# Patient Record
Sex: MALE | Race: WHITE | URBAN - METROPOLITAN AREA
[De-identification: names, ages, dates, MRNs, and addresses within clinical notes are randomized per-mention and may not be internally consistent; named-entity substitution may affect disease eponyms.]

---

## 2017-08-23 ENCOUNTER — TELEPHONE (OUTPATIENT)
Dept: INTERNAL MEDICINE | Facility: CLINIC | Age: 62
End: 2017-08-23

## 2017-08-23 NOTE — TELEPHONE ENCOUNTER
Reason for Call:  Same Day Appointment, Requested Provider:  Anthony Limon MD    PCP: No primary care provider on file. (Soon to be Ashly)    Reason for visit: ER follow up and note for work    Duration of symptoms: since 8/2    Have you been treated for this in the past? No    Additional comments: Please work him in ASAP because he needs to get back into work     Can we leave a detailed message on this number? YES    Phone number patient can be reached at: Home number on file [phone] (home)    Best Time: ASAP    Call taken on 8/23/2017 at 12:44 PM by Ace Vogel

## 2017-08-24 NOTE — TELEPHONE ENCOUNTER
Please verify this is correct chart/patient.   He has not been seen at a [facility] facility.    As best I can tell, he should not have been scheduled with me, as I am not taking new patients.    Please redirect to a provider accepting new patients.

## 2017-09-08 ENCOUNTER — TRANSFERRED RECORDS (OUTPATIENT)
Dept: HEALTH INFORMATION MANAGEMENT | Facility: CLINIC | Age: 62
End: 2017-09-08

## 2017-09-13 ENCOUNTER — OFFICE VISIT (OUTPATIENT)
Dept: INTERNAL MEDICINE | Facility: CLINIC | Age: 62
End: 2017-09-13
Payer: COMMERCIAL

## 2017-09-13 VITALS
HEART RATE: 91 BPM | BODY MASS INDEX: 27.71 KG/M2 | SYSTOLIC BLOOD PRESSURE: 175 MMHG | DIASTOLIC BLOOD PRESSURE: 92 MMHG | WEIGHT: 182.8 LBS | HEIGHT: 68 IN | TEMPERATURE: 98.7 F | OXYGEN SATURATION: 98 %

## 2017-09-13 DIAGNOSIS — Z09 HOSPITAL DISCHARGE FOLLOW-UP: Primary | ICD-10-CM

## 2017-09-13 DIAGNOSIS — Z72.0 TOBACCO ABUSE: ICD-10-CM

## 2017-09-13 DIAGNOSIS — Z71.89 COUNSELING REGARDING ADVANCED DIRECTIVES: ICD-10-CM

## 2017-09-13 DIAGNOSIS — Z23 NEED FOR TDAP VACCINATION: ICD-10-CM

## 2017-09-13 DIAGNOSIS — I63.9 CEREBROVASCULAR ACCIDENT (CVA), UNSPECIFIED MECHANISM (H): ICD-10-CM

## 2017-09-13 DIAGNOSIS — I10 ESSENTIAL HYPERTENSION, BENIGN: ICD-10-CM

## 2017-09-13 DIAGNOSIS — E78.5 HYPERLIPIDEMIA LDL GOAL <100: ICD-10-CM

## 2017-09-13 LAB
ALBUMIN SERPL-MCNC: 3.5 G/DL (ref 3.4–5)
ALP SERPL-CCNC: 101 U/L (ref 40–150)
ALT SERPL W P-5'-P-CCNC: 23 U/L (ref 0–70)
ANION GAP SERPL CALCULATED.3IONS-SCNC: 7 MMOL/L (ref 3–14)
AST SERPL W P-5'-P-CCNC: 19 U/L (ref 0–45)
BILIRUB SERPL-MCNC: 0.6 MG/DL (ref 0.2–1.3)
BUN SERPL-MCNC: 12 MG/DL (ref 7–30)
CALCIUM SERPL-MCNC: 8.9 MG/DL (ref 8.5–10.1)
CHLORIDE SERPL-SCNC: 103 MMOL/L (ref 94–109)
CHOLEST SERPL-MCNC: 145 MG/DL
CO2 SERPL-SCNC: 25 MMOL/L (ref 20–32)
CREAT SERPL-MCNC: 0.95 MG/DL (ref 0.66–1.25)
GFR SERPL CREATININE-BSD FRML MDRD: 80 ML/MIN/1.7M2
GLUCOSE SERPL-MCNC: 97 MG/DL (ref 70–99)
HDLC SERPL-MCNC: 62 MG/DL
LDLC SERPL CALC-MCNC: 71 MG/DL
NONHDLC SERPL-MCNC: 83 MG/DL
POTASSIUM SERPL-SCNC: 3.7 MMOL/L (ref 3.4–5.3)
PROT SERPL-MCNC: 7.7 G/DL (ref 6.8–8.8)
SODIUM SERPL-SCNC: 135 MMOL/L (ref 133–144)
TRIGL SERPL-MCNC: 61 MG/DL

## 2017-09-13 PROCEDURE — 80061 LIPID PANEL: CPT | Performed by: INTERNAL MEDICINE

## 2017-09-13 PROCEDURE — 80053 COMPREHEN METABOLIC PANEL: CPT | Performed by: INTERNAL MEDICINE

## 2017-09-13 PROCEDURE — 99203 OFFICE O/P NEW LOW 30 MIN: CPT | Performed by: INTERNAL MEDICINE

## 2017-09-13 PROCEDURE — 36415 COLL VENOUS BLD VENIPUNCTURE: CPT | Performed by: INTERNAL MEDICINE

## 2017-09-13 RX ORDER — ATORVASTATIN CALCIUM 10 MG/1
10 TABLET, FILM COATED ORAL AT BEDTIME
COMMUNITY
Start: 2017-08-04 | End: 2017-11-01

## 2017-09-13 RX ORDER — LISINOPRIL 10 MG/1
10 TABLET ORAL DAILY
Qty: 90 TABLET | Refills: 3 | Status: SHIPPED | OUTPATIENT
Start: 2017-09-13 | End: 2017-09-15

## 2017-09-13 RX ORDER — ASPIRIN 81 MG/1
81 TABLET, CHEWABLE ORAL DAILY
COMMUNITY
Start: 2017-08-04

## 2017-09-13 RX ORDER — LISINOPRIL 5 MG/1
5 TABLET ORAL DAILY
COMMUNITY
Start: 2017-09-08 | End: 2017-09-13 | Stop reason: DRUGHIGH

## 2017-09-13 NOTE — MR AVS SNAPSHOT
After Visit Summary   9/13/2017    Adebayo Sierra    MRN: [id number]           Patient Information     Date Of Birth          1955        Visit Information        Provider Department      9/13/2017 2:20 PM Kobe Goodwin MD [facility]        Today's Diagnoses     Hospital discharge follow-up    -  1    Cerebrovascular accident (CVA), unspecified mechanism (H)        Essential hypertension, benign        Hyperlipidemia LDL goal <100        Need for Tdap vaccination        Counseling regarding advanced directives        Tobacco abuse           Follow-ups after your visit        Additional Services     [facility] PT, HAND, AND CHIROPRACTIC REFERRAL       **This order will print in the [facility] Scheduling Office**    Physical Therapy, Hand Therapy and Chiropractic Care are available through:    *[facility]  *[facility]  *[facility]    Call one number to schedule at any of the above locations: [phone].    Your provider has referred you to: Physical Therapy at [facility] or [facility]    Indication/Reason for Referral: post CVA  Onset of Illness: 8/2017  Therapy Orders: Evaluate and Treat  Special Programs: None  Special Request: None    Meli Garrison      Additional Comments for the Therapist or Chiropractor:     Please be aware that coverage of these services is subject to the terms and limitations of your health insurance plan.  Call member services at your health plan with any benefit or coverage questions.      Please bring the following to your appointment:    *Your personal calendar for scheduling future appointments  *Comfortable clothing                  Follow-up notes from your care team     Return if symptoms worsen or fail to improve.      Who to contact     If you have questions or need follow up information about today's clinic visit or your schedule please contact [facility] directly at  "[phone].  Normal or non-critical lab and imaging results will be communicated to you by MyChart, letter or phone within 4 business days after the clinic has received the results. If you do not hear from us within 7 days, please contact the clinic through [employer] or phone. If you have a critical or abnormal lab result, we will notify you by phone as soon as possible.  Submit refill requests through [employer] or call your pharmacy and they will forward the refill request to us. Please allow 3 business days for your refill to be completed.          Additional Information About Your Visit        [employer] Information     [employer] lets you send messages to your doctor, view your test results, renew your prescriptions, schedule appointments and more. To sign up, go to [email or web address] . Click on \"Log in\" on the left side of the screen, which will take you to the Welcome page. Then click on \"Sign up Now\" on the right side of the page.     You will be asked to enter the access code listed below, as well as some personal information. Please follow the directions to create your username and password.     Your access code is: [email or web address]  Expires: 2017  3:24 PM     Your access code will  in 90 days. If you need help or a new code, please call your [place] clinic or [phone].        Care EveryWhere ID     This is your Care EveryWhere ID. This could be used by other organizations to access your [place] medical records  [id number]        Your Vitals Were     Pulse Temperature Height Pulse Oximetry BMI (Body Mass Index)       91 98.7  F (37.1  C) (Oral) 5' 8\" (1.727 m) 98% 27.79 kg/m2        Blood Pressure from Last 3 Encounters:   17 (!) 175/92    Weight from Last 3 Encounters:   17 182 lb 12.8 oz (82.9 kg)              We Performed the Following     Comprehensive metabolic panel     [facility], HAND, AND CHIROPRACTIC REFERRAL     Lipid Profile          Today's Medication Changes          These " changes are accurate as of: 9/13/17  3:24 PM.  If you have any questions, ask your nurse or doctor.               These medicines have changed or have updated prescriptions.        Dose/Directions    lisinopril 10 MG tablet   Commonly known as:  PRINIVIL/ZESTRIL   This may have changed:    - medication strength  - how much to take   Used for:  Essential hypertension, benign, Cerebrovascular accident (CVA), unspecified mechanism (H)   Changed by:  Kobe Goodwin MD        Dose:  10 mg   Take 1 tablet (10 mg) by mouth daily   Quantity:  90 tablet   Refills:  3            Where to get your medicines      These medications were sent to [facility] [address] [facility], [place] - [address] AT [facility] [address] [facility], [facility] [address]     Phone:  [phone]     lisinopril 10 MG tablet                Primary Care Provider    None Specified       No primary provider on file.        Equal Access to Services     [facility] : Hadii orin ku hadasho Soomaali, waaxda luqadaha, qaybta kaalmada adeegyada, waxay dixiein hayvidyan ashlyn brown . So [facility] [phone].    ATENCIÓN: Si habla español, tiene a costa disposición servicios gratuitos de asistencia lingüística. Chad al [phone].    We comply with applicable federal civil rights laws and [place] laws. We do not discriminate on the basis of race, color, national origin, age, disability sex, sexual orientation or gender identity.            Thank you!     Thank you for choosing [facility]  for your care. Our goal is always to provide you with excellent care. Hearing back from our patients is one way we can continue to improve our services. Please take a few minutes to complete the written survey that you may receive in the mail after your visit with us. Thank you!             Your Updated Medication List - Protect others around you: Learn how to safely use, store and throw away your medicines at  www.disposemymeds.org.          This list is accurate as of: 9/13/17  3:24 PM.  Always use your most recent med list.                   Brand Name Dispense Instructions for use Diagnosis    aspirin 81 MG chewable tablet      Take 81 mg by mouth daily        atorvastatin 10 MG tablet    LIPITOR     Take 10 mg by mouth At Bedtime        lisinopril 10 MG tablet    PRINIVIL/ZESTRIL    90 tablet    Take 1 tablet (10 mg) by mouth daily    Essential hypertension, benign, Cerebrovascular accident (CVA), unspecified mechanism (H)

## 2017-09-13 NOTE — LETTER
[facility]  [address] [address]  [phone]      9/14/2017       Adebayo Sierra  [address] [facility] [address]        Dear Adebayo,    I am pleased to inform you that your routine blood work including your sodium, potassium, calcium, kidney and liver function tests are all normal.    Your cholesterol looks good and I would not change anything at this point but would repeat your labs in 12 months.    Sincerely,      Kobe Goodwin MD  Internal Medicine

## 2017-09-13 NOTE — PROGRESS NOTES
SUBJECTIVE:   Adebayo Sierra is a 61 year old male who presents to clinic today for the following health issues:    New Patient/Transfer of Care- no routine care previous to hospitalization. Needs to discuss returning to work with MD. Started on lisinopril 5 mg on Friday- has been monitoring at home with results: 170/86, 160/98, 147/82, 172/90.     TDAP- DUE  Colonoscopy- DUE      Hospital Follow-up Visit:    Hospital/Nursing Home/IP Rehab Facility: [facility] (see care everywhere)  Date of Admission: 8/2/17  Date of Discharge: 8/7/47  Reason(s) for Admission: CVA            Problems taking medications regularly:  None       Medication changes since discharge: None       Problems adhering to non-medication therapy:  None    Summary of hospitalization:  Discharge summary unavailable  Diagnostic Tests/Treatments reviewed.  Follow up needed: Neurology  Other Healthcare Providers Involved in Patient s Care:         None  Update since discharge: stable.     Post Discharge Medication Reconciliation: discharge medications reconciled, continue medications without change.  Plan of care communicated with patient     Coding guidelines for this visit:  Type of Medical   Decision Making Face-to-Face Visit       within 7 Days of discharge Face-to-Face Visit        within 14 days of discharge   Moderate Complexity [id number]   High Complexity [id number]              Problem list and histories reviewed & adjusted, as indicated.  Additional history: as documented    Patient Active Problem List   Diagnosis     Counseling regarding advanced directives     History reviewed. No pertinent surgical history.    Social History   Substance Use Topics     Smoking status: Former Smoker     Years: 43.00     Types: Cigarettes     Smokeless tobacco: Never Used     Alcohol use Yes      Comment: 3-4 beers daily      History reviewed. No pertinent family history.      Current Outpatient Prescriptions   Medication Sig Dispense Refill      "atorvastatin (LIPITOR) 10 MG tablet Take 10 mg by mouth At Bedtime       aspirin 81 MG chewable tablet Take 81 mg by mouth daily       lisinopril (PRINIVIL/ZESTRIL) 5 MG tablet Take 5 mg by mouth daily       Not on File  BP Readings from Last 3 Encounters:   09/13/17 (!) 196/100    Wt Readings from Last 3 Encounters:   09/13/17 182 lb 12.8 oz (82.9 kg)            Reviewed and updated as needed this visit by clinical staffTobacco  Allergies  Med Hx  Surg Hx  Fam Hx  Soc Hx      Reviewed and updated as needed this visit by Provider         ROS:  C: NEGATIVE for fever, chills, change in weight  E/M: NEGATIVE for ear, mouth and throat problems  R: NEGATIVE for significant cough or SOB  CV: NEGATIVE for chest pain, palpitations or peripheral edema  GI: NEGATIVE for nausea, abdominal pain, heartburn, or change in bowel habits  : NEGATIVE for frequency, dysuria, or hematuria  M: NEGATIVE for significant arthralgias or myalgia  H: NEGATIVE for bleeding problems  P: NEGATIVE for changes in mood or affect    OBJECTIVE:                                                    BP (!) 175/92  Pulse 91  Temp 98.7  F (37.1  C) (Oral)  Ht 5' 8\" (1.727 m)  Wt 182 lb 12.8 oz (82.9 kg)  SpO2 98%  BMI 27.79 kg/m2  Body mass index is 27.79 kg/(m^2). initial 198/85  GENERAL: alert and no distress  EYES: Eyes grossly normal to inspection, extraocular movements - intact, and PERRL  HENT: ear canals- normal; TMs- normal; Nose- normal; Mouth- no ulcers, no lesions  NECK: no tenderness, no adenopathy, no asymmetry, no masses, no stiffness; thyroid- normal to palpation  RESP: lungs clear to auscultation - no rales, no rhonchi, no wheezes  CV: regular rates and rhythm, normal S1 S2, no S3 or S4 and no click or rub   ABDOMEN: soft, no tenderness, no  hepatosplenomegaly, no masses, normal bowel sounds  MS: extremities- no gross deformities noted, no edema  NEURO: no focal changes on exam less perhaps slightly weakness proximal right " shoulder.  PSYCH: Alert and oriented times 3; speech- coherent , normal rate and volume; able to articulate logical thoughts, able to abstract reason, no tangential thoughts, no hallucinations or delusions, affect- normal     ASSESSMENT/PLAN:                                                      (Z09) Hospital discharge follow-up  (primary encounter diagnosis)  Comment: no summary available  Plan: test and interventions reviewed    (I63.9) Cerebrovascular accident (CVA), unspecified mechanism (H)  Comment: appears stable and markedly better  Plan: lisinopril (PRINIVIL/ZESTRIL) 10 MG tablet,         Comprehensive metabolic panel, [facility] PT, HAND,         AND CHIROPRACTIC REFERRAL        Suggest some mild PT for strengthening    (I10) Essential hypertension, benign  Comment: needs slightly better control, increase Lisinopril to 10mg daily  Plan: lisinopril (PRINIVIL/ZESTRIL) 10 MG tablet,         Comprehensive metabolic panel        BP check 2 weeks after testing done at [facility]  for follow-up, scheduled    (E78.5) Hyperlipidemia LDL goal <100  Comment: goal LDL as noted, on statin  Plan: Comprehensive metabolic panel, Lipid Profile            (Z23) Need for Tdap vaccination  Comment: updated  Plan: TDAP VACCINE (ADACEL)            (Z71.89) Counseling regarding advanced directives  Comment: advised  Plan:     (Z72.0) Tobacco abuse  Comment:   Plan: Smoking cessation was advised and the risks of continued smoking in regards to this patients health history was reiterated. Options of smoking cessation were also discussed. This time extended beyond the routine exam.    See Patient Instructions for routine follow-up    Kobe Goodwin MD  [facility]

## 2017-09-13 NOTE — NURSING NOTE
"Chief Complaint   Patient presents with     New Patient     Hypertension       Initial BP (!) 196/100  Pulse 91  Temp 98.7  F (37.1  C) (Oral)  Ht 5' 8\" (1.727 m)  Wt 182 lb 12.8 oz (82.9 kg)  SpO2 98%  BMI 27.79 kg/m2 Estimated body mass index is 27.79 kg/(m^2) as calculated from the following:    Height as of this encounter: 5' 8\" (1.727 m).    Weight as of this encounter: 182 lb 12.8 oz (82.9 kg).  Medication Reconciliation: complete   Enma Gonzalez, KEITH      "

## 2017-09-15 ENCOUNTER — TELEPHONE (OUTPATIENT)
Dept: NURSING | Facility: CLINIC | Age: 62
End: 2017-09-15

## 2017-09-15 ENCOUNTER — HOSPITAL ENCOUNTER (OUTPATIENT)
Dept: PHYSICAL THERAPY | Facility: CLINIC | Age: 62
Setting detail: THERAPIES SERIES
End: 2017-09-15
Attending: INTERNAL MEDICINE
Payer: COMMERCIAL

## 2017-09-15 DIAGNOSIS — I10 ESSENTIAL HYPERTENSION, BENIGN: ICD-10-CM

## 2017-09-15 DIAGNOSIS — I63.9 CEREBROVASCULAR ACCIDENT (CVA), UNSPECIFIED MECHANISM (H): ICD-10-CM

## 2017-09-15 PROCEDURE — 97110 THERAPEUTIC EXERCISES: CPT | Mod: GP | Performed by: PHYSICAL THERAPIST

## 2017-09-15 PROCEDURE — 40000719 ZZHC STATISTIC PT DEPARTMENT NEURO VISIT: Performed by: PHYSICAL THERAPIST

## 2017-09-15 PROCEDURE — 97163 PT EVAL HIGH COMPLEX 45 MIN: CPT | Mod: GP | Performed by: PHYSICAL THERAPIST

## 2017-09-15 RX ORDER — LISINOPRIL 10 MG/1
20 TABLET ORAL DAILY
Qty: 90 TABLET | Refills: 3 | COMMUNITY
Start: 2017-09-15 | End: 2017-10-11

## 2017-09-15 NOTE — TELEPHONE ENCOUNTER
Ashlyn calling from  PT  [phone]      bp at rest today 205/90, 210/90.  Does not want to do anything to exert pt d/t these high readings.  Pt is not having any symptoms.  Did increase Lisinopril to 10mg as advised at last office visit.  Call pt back if any advisement on bp.    Pt is asking when he can return to work.  With the order you sent she can access weakness and develop some strengthening for pt, but she can not evaluate if pt can go back to work or not.   If you are wanting evaluation on whether pt can return to work, it would need to be a separate order stating this specifically.  Another option would be a cardiac rehab  therapist that would monitor bp's .      Please advise what you are looking for with the PT referral.   Or would you be willing to decide when pt can return to work?

## 2017-09-23 NOTE — ADDENDUM NOTE
Encounter addended by: Ashlyn Caballero, PT on: 9/23/2017  2:41 PM<BR>     Actions taken: Sign clinical note, Flowsheet accepted

## 2017-09-23 NOTE — PROGRESS NOTES
09/15/17 1300   Quick Adds   Type of Visit Initial OP PT Evaluation   General Information   Start of Care Date 09/15/17   Referring Physician Kboe Goodwin MD   Orders Evaluate and Treat as Indicated   Order Date 09/13/17   Medical Diagnosis Cerebrovascular accident (CVA), unspecified mechanism (H) I63.9    Onset of illness/injury or Date of Surgery 08/02/17   Precautions/Limitations other (see comments)  (elevated BP - per MD notes and therapist assessment )   Special Instructions (none)   Surgical/Medical history reviewed Yes  (limited available through [employer], no routine care)   Pertinent history of current problem (include personal factors and/or comorbidities that impact the POC) 62 yo M s/p CVA admitted to [facility] ED on 8/2/17 and d/c after 3 day hospital stay. Hospital summary is not available through [employer]. Pt reports he was getting ready for work that morning with sudden onset of fatigue, R UE and LE paralyzed and facial drooping. Denies visual or sensation changes. His spouse called EMS and brought to [facility]. He describes tPA administration and by the evening felt his strength fully restored. Pt f/u with newly established primary care 2 days ago. Per chart review of this visit, MD notes elevated BPs: 198/85, and perhaps slight weakness proximal R shoulder and referred to PT for strengthening. Adebayo denies any notable weakness in UE and reports he is here for return to work assessment. He also recently increased Lisinopril from 5 mg to 10 mg d/t continued elevated BPs. He has been monitoring his BPs and attributes elevation to  white coat syndrome.  He hasn t had medical care since the 1970s d/t frustrations and anxiety around medical examinations, but is pleased with newly established primary care MD. He reports plans to retire from his very stressful job as a , but is eager to have orders for return to work so that he knows whether to file for disability or to have the option of working  as needed, and has financial concerns should he not be cleared to work. He attributes his CVA to the stress from this job and smoking. He has stopped smoking entirely since CVA. He has f/u for additional CT scan and scheduling another f/u with primary for BP monitoring.   Prior level of function comment CURRENT FUNCTION: Since CVA he has started to walk for exercise, has increased from 1 to 3 miles a day. 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 He denies any significant respiratory fatigue after his walks, has not taken is BP following.  PLOF: Working fulltime as . Work related tasks include: heavy lifting < 50 lbs, prolonged standing/walking/changes in position. Indpendent mobility and self cares ADLs/IADLs. No prior exercise.   Diagnostic Tests MRI;CT Scan   Previous/Current Treatment Medication(s)   Improvement after medication Significant   Current Community Support Family/friend caregiver  (lives with spouse, daughter drove to today's appointment)   Patient role/Employment history Employed  (, currently taking vacation time since CVA)   Living environment House/[facility]   Home/Community Accessibility Comments 1 level home with 1 step to enter and flight of stairs to basement with railing.    Assistive Devices Comments (none)   Patient/Family Goals Statement wants to know if he can return to work   Fall Risk Screen   Fall screen completed by PT   Per patient - Fall 2 or more times in past year? No   Per patient - Fall with injury in past year? No   Pain   Patient currently in pain No   Vital Signs   Vital Signs Pulse;BP;SpO2  (in seated rest, asymtomatic)   Pulse 77   BP (!) 210/90   SpO2 99 %   Cognitive Status Examination   Orientation orientation to person, place and time   Level of Consciousness alert   Follows Commands and Answers Questions 100% of the time   Posture   Posture Comments no significant deviations from normal   Strength   Strength  Comments plan on further assessment   Bed Mobility   Bed Mobility Comments reports independance   Transfer Skills   Transfer Comments independent without UE support required   Gait   Gait Comments over short distances indoors without AD, no significant observable deviations from norm   Gait Special Tests 25 Foot Timed Walk   Comments TBA   Gait Special Tests Functional Gait Assessment Score out of 30   Comments TBA   Balance   Balance Comments TBA   Sensory Examination   Sensory Perception Comments denies numbness/tingling   Planned Therapy Interventions   Planned Therapy Interventions strengthening;ROM;stretching;motor coordination training;neuromuscular re-education;other (see comments)   Clinical Impression   Criteria for Skilled Therapeutic Interventions Met yes, treatment indicated   PT Diagnosis impaired functional mobility   Influenced by the following impairments elevated BP at rest    Functional limitations due to impairments Independent participation in appropriate walking and functional strengthening exercise. Working labor intensive job.      Clinical Presentation Unstable/Unpredictable   Clinical Presentation Rationale elevated BP at rest    Clinical Decision Making (Complexity) High complexity   Therapy Frequency (to be determined upon further assessment)   Predicted Duration of Therapy Intervention (days/wks) to be determined upon further assessment; timeline dependent upon return to therapy for further assessment   Risk & Benefits of therapy have been explained Yes   Patient, Family & other staff in agreement with plan of care Yes   Clinical Impression Comments Unable to complete assessment d/t elevated BP at rest. During assessment therapist spoke with Dr iKng s triage nurse to discuss elevated BP which are known by MD. She confirms no cause for emergent follow-up, and recommending plan for continued self monitoring for any associated sxs. Therapist also requesting f/u on MD s intention from  assessment: return to work, treatment of mild strength deficits observed per MD note, or closer cardiac monitoring with exercise to exaggerated BP. Recommending separate orders for return to work or cardiac rehab as indicated. Therapist reviewed this discussion with Adebayo, advising emergent follow-up with any stroke-like sxs, follow-up with MD as planned, and follow-up with therapy as indicated. His daughter, Chey arrives to speak to therapist following session for clarification d/t her father s frustration and confusion around purpose of therapy visit. She reports he noted continued fine motor coordination limitations, and therapist discussed possible follow up with occupational therapist to address fine motor dexterity concerns. She reports good understanding of repeated education around elevated BPs and appropraite follow-up, and will continue to assist her father in directing his care. Following this appointment, this therapist received VM clarifying MD s intention for therapist to address any residual weakness, without return to work assessment. Therapist recommending pt return for completed PT assessment after f/u with MD and improved BP control.   Education Assessment   Barriers to Learning Emotional   GOALS   PT Eval Goals 1   Goal 1   Goal Identifier HEP   Goal Description Pt to demonstrate appropraite vital response to walking and functional strengthening exercise.   Target Date 11/10/17   Total Evaluation Time   Total Evaluation Time (Minutes) 32

## 2017-09-25 ENCOUNTER — OFFICE VISIT (OUTPATIENT)
Dept: INTERNAL MEDICINE | Facility: CLINIC | Age: 62
End: 2017-09-25
Payer: COMMERCIAL

## 2017-09-25 VITALS
DIASTOLIC BLOOD PRESSURE: 82 MMHG | SYSTOLIC BLOOD PRESSURE: 152 MMHG | WEIGHT: 186.7 LBS | BODY MASS INDEX: 28.3 KG/M2 | OXYGEN SATURATION: 97 % | HEIGHT: 68 IN | HEART RATE: 74 BPM | TEMPERATURE: 98.1 F

## 2017-09-25 DIAGNOSIS — Z01.818 PREOP GENERAL PHYSICAL EXAM: Primary | ICD-10-CM

## 2017-09-25 DIAGNOSIS — I10 ESSENTIAL HYPERTENSION, BENIGN: ICD-10-CM

## 2017-09-25 DIAGNOSIS — Z72.0 TOBACCO ABUSE: ICD-10-CM

## 2017-09-25 DIAGNOSIS — I63.9 CEREBROVASCULAR ACCIDENT (CVA), UNSPECIFIED MECHANISM (H): ICD-10-CM

## 2017-09-25 LAB — HGB BLD-MCNC: 14.7 G/DL (ref 13.3–17.7)

## 2017-09-25 PROCEDURE — 85018 HEMOGLOBIN: CPT | Performed by: INTERNAL MEDICINE

## 2017-09-25 PROCEDURE — 36415 COLL VENOUS BLD VENIPUNCTURE: CPT | Performed by: INTERNAL MEDICINE

## 2017-09-25 PROCEDURE — 93000 ELECTROCARDIOGRAM COMPLETE: CPT | Performed by: INTERNAL MEDICINE

## 2017-09-25 PROCEDURE — 99215 OFFICE O/P EST HI 40 MIN: CPT | Performed by: INTERNAL MEDICINE

## 2017-09-25 RX ORDER — CLOPIDOGREL BISULFATE 75 MG/1
150 TABLET ORAL DAILY
Refills: 3 | COMMUNITY
Start: 2017-09-22 | End: 2018-01-17

## 2017-09-25 RX ORDER — FAMOTIDINE 20 MG/1
1 TABLET, FILM COATED ORAL 2 TIMES DAILY
Refills: 3 | COMMUNITY
Start: 2017-09-22 | End: 2018-01-17

## 2017-09-25 NOTE — MR AVS SNAPSHOT
After Visit Summary   9/25/2017    Adebayo Sierra    MRN: [id number]           Patient Information     Date Of Birth          1955        Visit Information        Provider Department      9/25/2017 11:20 AM Kobe Goodwin MD [facility]        Today's Diagnoses     Preop general physical exam    -  1    Cerebrovascular accident (CVA), unspecified mechanism (H)        Essential hypertension, benign        Tobacco abuse          Care Instructions      Before Your Surgery      Call your surgeon if there is any change in your health. This includes signs of a cold or flu (such as a sore throat, runny nose, cough, rash or fever).    Do not smoke, drink alcohol or take over the counter medicine (unless your surgeon or primary care doctor tells you to) for the 24 hours before and after surgery.    If you take prescribed drugs: Follow your doctor s orders about which medicines to take and which to stop until after surgery.    Eating and drinking prior to surgery: follow the instructions from your surgeon    Take a shower or bath the night before surgery. Use the soap your surgeon gave you to gently clean your skin. If you do not have soap from your surgeon, use your regular soap. Do not shave or scrub the surgery site.  Wear clean pajamas and have clean sheets on your bed.           Follow-ups after your visit        Follow-up notes from your care team     Return if symptoms worsen or fail to improve.      Who to contact     If you have questions or need follow up information about today's clinic visit or your schedule please contact [facility] directly at [phone].  Normal or non-critical lab and imaging results will be communicated to you by MyChart, letter or phone within 4 business days after the clinic has received the results. If you do not hear from us within 7 days, please contact the clinic through MyChart or phone. If you have a critical or  "abnormal lab result, we will notify you by phone as soon as possible.  Submit refill requests through [employer] or call your pharmacy and they will forward the refill request to us. Please allow 3 business days for your refill to be completed.          Additional Information About Your Visit        MyChart Information     [employer] lets you send messages to your doctor, view your test results, renew your prescriptions, schedule appointments and more. To sign up, go to [email or web address] . Click on \"Log in\" on the left side of the screen, which will take you to the Welcome page. Then click on \"Sign up Now\" on the right side of the page.     You will be asked to enter the access code listed below, as well as some personal information. Please follow the directions to create your username and password.     Your access code is: [email or web address]  Expires: 2017  3:24 PM     Your access code will  in 90 days. If you need help or a new code, please call your [facility] clinic or [phone].        Care EveryWhere ID     This is your [facility] EveryWhere ID. This could be used by other organizations to access your [facility] medical records  [id number]        Your Vitals Were     Pulse Temperature Height Pulse Oximetry BMI (Body Mass Index)       74 98.1  F (36.7  C) (Oral) 5' 8\" (1.727 m) 97% 28.39 kg/m2        Blood Pressure from Last 3 Encounters:   17 152/82   17 (!) 175/92    Weight from Last 3 Encounters:   17 186 lb 11.2 oz (84.7 kg)   17 182 lb 12.8 oz (82.9 kg)              We Performed the Following     EKG 12-lead complete w/read - Clinics     Hemoglobin        Primary Care Provider Office Phone # Fax #    Kobe Goodwin [phone]       [address] [place] [id number]        Equal Access to Services     DOC BUTLER AH: Bertha Hou, ana laura kumar, rochelle ocampoaan ah. So [facility] " [phone].    ATENCIÓN: Si bernadette pimentel, tiene a costa disposición servicios gratuitos de asistencia lingüística. Chad pagan [phone].    We comply with applicable federal civil rights laws and [place] laws. We do not discriminate on the basis of race, color, national origin, age, disability sex, sexual orientation or gender identity.            Thank you!     Thank you for choosing [facility][1]  for your care. Our goal is always to provide you with excellent care. Hearing back from our patients is one way we can continue to improve our services. Please take a few minutes to complete the written survey that you may receive in the mail after your visit with us. Thank you!             Your Updated Medication List - Protect others around you: Learn how to safely use, store and throw away your medicines at www.disposemymeds.org.          This list is accurate as of: 9/25/17 12:16 PM.  Always use your most recent med list.                   Brand Name Dispense Instructions for use Diagnosis    aspirin 81 MG chewable tablet      Take 81 mg by mouth daily        atorvastatin 10 MG tablet    LIPITOR     Take 10 mg by mouth At Bedtime        clopidogrel 75 MG tablet    PLAVIX     Take 1 tablet by mouth daily        famotidine 20 MG tablet    PEPCID     Take 1 tablet by mouth 2 times daily        lisinopril 10 MG tablet    PRINIVIL/ZESTRIL    90 tablet    Take 2 tablets (20 mg) by mouth daily    Essential hypertension, benign, Cerebrovascular accident (CVA), unspecified mechanism (H)

## 2017-09-25 NOTE — PROGRESS NOTES
[address] [address]  [phone]  Dept: [phone]    PRE-OP EVALUATION:  Today's date: 2017    Adebayo Sierra (: 1955) presents for pre-operative evaluation assessment as requested by Dr. Allan.  He requires evaluation and anesthesia risk assessment prior to undergoing surgery/procedure for treatment of occulusion of of ICA.  Proposed procedure: stent-assisted angioplasty of ICA     Date of Surgery/ Procedure: 10/3/2017  Time of Surgery/ Procedure:10:30  Hospital/Surgical Facility: [facility]  Fax number for surgical facility: [phone]  Primary Physician: Kobe Goodwin  Type of Anesthesia Anticipated: Conscious sedation     Patient has a Health Care Directive or Living Will:  NO    HPI:                                                      Brief HPI related to upcoming procedure: treatment of occulusion of of ICA.  Proposed procedure: stent-assisted angioplasty of ICA     Adebayo Sierra is a 61 year old male here for preoperative assessment for treatment of occulusion of of ICA.  Proposed procedure: stent-assisted angioplasty of ICA.    See problem list for active medical problems.  Problems all longstanding and stable, except as noted/documented.  See ROS for pertinent symptoms related to these conditions.                                                                                                  .    MEDICAL HISTORY:                                                      Patient Active Problem List    Diagnosis Date Noted     Counseling regarding advanced directives 2017     Priority: Medium     Advance Care Planning 2017: ACP Review of Chart / Resources Provided:  Reviewed chart for advance care plan.  Adebayo Sierra has been provided information and resources to begin or update their advance care plan.  Added by Enma Gonzalez             Cerebrovascular accident (CVA), unspecified mechanism (H)  "09/13/2017     Priority: Medium     Essential hypertension, benign 09/13/2017     Priority: Medium     Hyperlipidemia LDL goal <100 09/13/2017     Priority: Medium     Tobacco abuse 09/13/2017     Priority: Medium      History reviewed. No pertinent past medical history.  History reviewed. No pertinent surgical history.  Current Outpatient Prescriptions   Medication Sig Dispense Refill     lisinopril (PRINIVIL/ZESTRIL) 10 MG tablet Take 2 tablets (20 mg) by mouth daily 90 tablet 3     atorvastatin (LIPITOR) 10 MG tablet Take 10 mg by mouth At Bedtime       aspirin 81 MG chewable tablet Take 81 mg by mouth daily       OTC products: None, except as noted above    Not on File   Latex Allergy: NO    Social History   Substance Use Topics     Smoking status: Former Smoker     Years: 43.00     Types: Cigarettes     Smokeless tobacco: Never Used     Alcohol use Yes      Comment: 3-4 beers daily      History   Drug Use No       REVIEW OF SYSTEMS:                                                    C: NEGATIVE for fever, chills, change in weight  E/M: NEGATIVE for ear, mouth and throat problems  R: NEGATIVE for significant cough or SOB  CV: NEGATIVE for chest pain, palpitations or peripheral edema  GI: NEGATIVE for nausea, abdominal pain, heartburn, or change in bowel habits  : NEGATIVE for frequency, dysuria, or hematuria  M: NEGATIVE for significant arthralgias or myalgia  H: NEGATIVE for bleeding problems  P: NEGATIVE for changes in mood or affect    EXAM:                                                    /82  Pulse 74  Temp 98.1  F (36.7  C) (Oral)  Ht 5' 8\" (1.727 m)  Wt 186 lb 11.2 oz (84.7 kg)  SpO2 97%  BMI 28.39 kg/m2    GENERAL APPEARANCE:  alert and no distress     EYES: EOMI,  PERRL     HENT: ear canals and TM's normal and nose and mouth without ulcers or lesions     NECK: no adenopathy, no asymmetry, masses, or scars and thyroid normal to palpation     RESP: lungs clear to auscultation - no rales, " rhonchi or wheezes     CV: regular rates and rhythm, normal S1 S2, no S3 or S4 and no click or rub     ABDOMEN:  soft, nontender, no HSM or masses and bowel sounds normal     MS: extremities normal- no gross deformities noted, no evidence of inflammation in joints, FROM in all extremities.     NEURO: No focal changes less slightly slow YOAV R>L hand.     PSYCH: mentation appears normal and affect normal/bright    DIAGNOSTICS:                                                      EKG: Normal Sinus Rhythm, nonspecific intraventricular conduction delay, nonspecific ST-T changes, there are no prior tracings available, see copy  Labs Drawn and in Process:   Unresulted Labs Ordered in the Past 30 Days of this Admission     No orders found from 7/27/2017 to 9/26/2017.          Recent Labs   Lab Test  09/13/17   1459   NA  135   POTASSIUM  3.7   CR  0.95      Component      Latest Ref Rng & Units 9/13/2017   Sodium      133 - 144 mmol/L 135   Potassium      3.4 - 5.3 mmol/L 3.7   Chloride      94 - 109 mmol/L 103   Carbon Dioxide      20 - 32 mmol/L 25   Anion Gap      3 - 14 mmol/L 7   Glucose      70 - 99 mg/dL 97   Urea Nitrogen      7 - 30 mg/dL 12   Creatinine      0.66 - 1.25 mg/dL 0.95   GFR Estimate      >60 mL/min/1.7m2 80   GFR Estimate If Black      >60 mL/min/1.7m2 >90   Calcium      8.5 - 10.1 mg/dL 8.9   Bilirubin Total      0.2 - 1.3 mg/dL 0.6   Albumin      3.4 - 5.0 g/dL 3.5   Protein Total      6.8 - 8.8 g/dL 7.7   Alkaline Phosphatase      40 - 150 U/L 101   ALT      0 - 70 U/L 23   AST      0 - 45 U/L 19   Cholesterol      <200 mg/dL 145   Triglycerides      <150 mg/dL 61   HDL Cholesterol      >39 mg/dL 62   LDL Cholesterol Calculated      <100 mg/dL 71   Non HDL Cholesterol      <130 mg/dL 83     IMPRESSION:                                                    Reason for surgery/procedure: treatment of occulusion of of ICA.  Proposed procedure: stent-assisted angioplasty of ICA     The proposed surgical  procedure is considered mild risk.    REVISED CARDIAC RISK INDEX  The patient has the following serious cardiovascular risks for perioperative complications such as (MI, PE, VFib and 3  AV Block):  Cerebrovascular Disease (TIA or CVA) prior history  INTERPRETATION: 2 risks: Class III (moderate risk - 6.6% complication rate)    The patient has the following additional risks for perioperative complications:      ICD-10-CM    1. Preop general physical exam Z01.818 Hemoglobin     EKG 12-lead complete w/read - Clinics   2. Cerebrovascular accident (CVA), unspecified mechanism (H) I63.9    3. Essential hypertension, benign I10    4. Tobacco abuse Z72.0      (Z01.818) Preop general physical exam  (primary encounter diagnosis)  Comment: as ordered  Plan: Hemoglobin, EKG 12-lead complete w/read -         Clinics            (I63.9) Cerebrovascular accident (CVA), unspecified mechanism (H)  Comment: appears resolving w/o residual changes  Plan:     (I10) Essential hypertension, benign  Comment: has been fluctuant at times and suggest close observation  Plan:     (Z72.0) Tobacco abuse  Comment:   Plan: Smoking cessation was advised and the risks of continued smoking in regards to this patients health history was reiterated. Options of smoking cessation were also discussed. This time extended beyond the routine exam.      RECOMMENDATIONS:                                                      --Consult hospital rounder / IM to assist post-op medical management    Cardiovascular Risk  Performs 4 METs exercise without symptoms (Light housework (dusting, washing dishes) and Climb a flight of stairs) .     --Patient is to take all scheduled medications on the day of surgery EXCEPT for modifications listed below.    Anticoagulant or Antiplatelet Medication Use  ASPIRIN/Plavix: Only discontinue prior to procedure to reduce bleeding risk as advised per surgery.  It should be resumed post-operatively.      ACE Inhibitor or Angiotensin  Receptor Blocker (ARB) Use  Ace inhibitor or Angiotensin Receptor Blocker (ARB) and should HOLD this medication for the 24 hours prior to surgery.    APPROVAL GIVEN to proceed with proposed procedure, without further diagnostic evaluation     Signed Electronically by: Kobe Goodwin MD    Copy of this evaluation report is provided to requesting physician, Dr. Sanjana Duarte Preop Guidelines

## 2017-09-25 NOTE — NURSING NOTE
"Chief Complaint   Patient presents with     Pre-Op Exam       Initial BP (!) 188/94  Pulse 74  Temp 98.1  F (36.7  C) (Oral)  Ht 5' 8\" (1.727 m)  Wt 186 lb 11.2 oz (84.7 kg)  SpO2 97%  BMI 28.39 kg/m2 Estimated body mass index is 28.39 kg/(m^2) as calculated from the following:    Height as of this encounter: 5' 8\" (1.727 m).    Weight as of this encounter: 186 lb 11.2 oz (84.7 kg).  Medication Reconciliation: complete   Enma Gonzalez CMA      "

## 2017-09-26 ENCOUNTER — TELEPHONE (OUTPATIENT)
Dept: INTERNAL MEDICINE | Facility: CLINIC | Age: 62
End: 2017-09-26

## 2017-09-26 NOTE — TELEPHONE ENCOUNTER
Reason for Call:  Other     Detailed comments: The patient called to let Dr. Goodwin and Enma know that his surgery is scheduled for 10/3/17 at 10:30 AM    Phone Number Patient can be reached at: Home number on file [phone] (home)    Best Time: No need to call    Can we leave a detailed message on this number? NO    Call taken on 9/26/2017 at 11:27 AM by Lilian Hayden

## 2017-09-26 NOTE — ADDENDUM NOTE
Encounter addended by: Ashlyn Caballero PT on: 9/26/2017  8:07 AM<BR>     Actions taken: Flowsheet accepted

## 2017-09-26 NOTE — ADDENDUM NOTE
Encounter addended by: Ashlyn Caballero PT on: 9/26/2017  8:06 AM<BR>     Actions taken: Charge Capture section accepted

## 2017-10-03 ENCOUNTER — TRANSFERRED RECORDS (OUTPATIENT)
Dept: HEALTH INFORMATION MANAGEMENT | Facility: CLINIC | Age: 62
End: 2017-10-03

## 2017-10-11 ENCOUNTER — OFFICE VISIT (OUTPATIENT)
Dept: INTERNAL MEDICINE | Facility: CLINIC | Age: 62
End: 2017-10-11
Payer: COMMERCIAL

## 2017-10-11 VITALS
DIASTOLIC BLOOD PRESSURE: 100 MMHG | WEIGHT: 187.9 LBS | OXYGEN SATURATION: 99 % | SYSTOLIC BLOOD PRESSURE: 218 MMHG | BODY MASS INDEX: 28.57 KG/M2 | TEMPERATURE: 98.1 F | HEART RATE: 79 BPM

## 2017-10-11 DIAGNOSIS — I65.21 ASYMPTOMATIC STENOSIS OF RIGHT CAROTID ARTERY: ICD-10-CM

## 2017-10-11 DIAGNOSIS — I63.232: ICD-10-CM

## 2017-10-11 DIAGNOSIS — I63.9 CEREBROVASCULAR ACCIDENT (CVA), UNSPECIFIED MECHANISM (H): Primary | ICD-10-CM

## 2017-10-11 DIAGNOSIS — I10 ESSENTIAL HYPERTENSION, BENIGN: ICD-10-CM

## 2017-10-11 PROCEDURE — 99214 OFFICE O/P EST MOD 30 MIN: CPT | Performed by: INTERNAL MEDICINE

## 2017-10-11 RX ORDER — LISINOPRIL 10 MG/1
20 TABLET ORAL DAILY
Qty: 90 TABLET | Refills: 0
Start: 2017-10-11 | End: 2017-10-31 | Stop reason: DRUGHIGH

## 2017-10-11 NOTE — PROGRESS NOTES
SUBJECTIVE:   Adebayo Sierra is a 61 year old male who presents to clinic today for the following health issues:    Hospital Follow-up Visit:    Hospital/Nursing Home/IP Rehab Facility: [facility]  Date of Admission: 10-3-17  Date of Discharge: 10-6-17  Reason(s) for Admission: Carotid Stent surgery            Problems taking medications regularly:  None       Medication changes since discharge: None       Problems adhering to non-medication therapy:  None    Summary of hospitalization:  CareEverywhere information obtained and reviewed  Diagnostic Tests/Treatments reviewed.  Follow up needed: none  Other Healthcare Providers Involved in Patient s Care:         Specialist appointment - neuro   Update since discharge: improved.     Post Discharge Medication Reconciliation: discharge medications reconciled and changed, per note/orders (see AVS).  Plan of care communicated with patient and family     Coding guidelines for this visit:  Type of Medical   Decision Making Face-to-Face Visit       within 7 Days of discharge Face-to-Face Visit        within 14 days of discharge   Moderate Complexity 46612 [id number]   High Complexity 01699 03848          FINAL DIAGNOSES  1. Asymptomatic severe right ICA stenosis (principal diagnosis)  2. H/o left hemisphere ischemic stroke  3. H/o left ICA occlusion  4. HTN  5. Hyperlipidemia  6. Former tobacco use  7. Anxiety    ISHA PROCEDURE(S): S/p right ICA stent-assisted angioplasty with distal embolic protection on 10/3/2017 by Dr. Allan    BRIEF HISTORY:  Adebayo Villanueva is a 60 yo with history of recent left hemisphere stroke (8/2017), left carotid occlusion, uncontrolled HTN, hyperlipidemia, former tobacco use, alcohol dependence, probable anxiety and no medical care for 40+ years until recent stroke who was admitted 10/3-10/6/17 for stent-assisted angioplasty of asymptomatic severe right ICA stenosis.     His recent stroke on 8/2/17 was due to carotid occlusion. He is s/p  mechanical thrombectomy of left MCA and balloon angioplasty at origin of left ICA. However, the left ICA re-occluded by POD 1. He made a good recovery from his stroke. Treatment of the severely stenotic right ICA was recommended to prevent recurrent stroke in the setting of a contralateral ICA occlusion. He was not a candidate for CEA because this would require intra-op occlusion of the right ICA. Pre-op NIHSS was 0.    HOSPITAL COURSE:   On 10/3/2017 patient underwent successful right ICA stent-assisted angioplasty with distal embolic protection by Dr. Alberto Allan. The post-procedure residual steonsis was 15%. On POD 0 his SBP dipped below 120 and he developed expressive aphasia, right facial droop and right pronator drift. His symptoms resolved with fluids, albumin and pressor. Phenylephrine was weaned on POD 1. He transferred to the floor on POD 2. He remained asymptomatic throughout the remainder of this admission. His SBP remained > 140 once the pressor was discontinued. He discharged home on POD 3. By the time of discharge he was ambulating independently in the halls, tolerating regular diet, voiding without issues and groin site was healing well. NIHSS was 0 at discharge. He did not exhibit any signs of alcohol withdrawal. He is scheduled for a 1 month clinic follow-up with [facility] on 11/3/2017.    Lisinopril was not resumed to allow for permissive HTN. He should continue to hold lisinopril until follow-up with his PCP next week unless SBP consistently > 180. Goal SBP at least greater than 120 as he does not tolerate a BP lower than this. Patient reported feeling at his best when SBP > 140. He should also remain well-hydrated.     Dual antiplatelet therapy will be continued until 3 months post-op then aspirin alone thereafter for life. Pre-op P2Y12 PRU testing showed a therapeutic response to clopidogrel so no dose adjustments were required. A PRU will be rechecked 1 week post-op. Neuro IR will manage patient's  clopidogrel dosing. Pre-op aspirin therapy effect testing showed a therapeutic response to 81mg daily.    Since his d/c home he has taken only 10 mg of the lisinopril and NONE today.    Problem list and histories reviewed & adjusted, as indicated.  Additional history: as documented    Labs reviewed in EPIC    Reviewed and updated as needed this visit by clinical staff       Reviewed and updated as needed this visit by Provider         ROS:  Constitutional, HEENT, cardiovascular, pulmonary, gi and gu systems are negative, except as otherwise noted.      OBJECTIVE:                                                    BP (!) 218/100  Pulse 79  Temp 98.1  F (36.7  C) (Oral)  Wt 187 lb 14.4 oz (85.2 kg)  SpO2 99%  BMI 28.57 kg/m2  Body mass index is 28.57 kg/(m^2).  GENERAL APPEARANCE: alert and no distress  HENT: nose and mouth without ulcers or lesions  NECK: no adenopathy, no asymmetry, masses, or scars and thyroid normal to palpation  RESP: lungs clear to auscultation - no rales, rhonchi or wheezes  CV: regular rates and rhythm, normal S1 S2, no S3 or S4 and no murmur, click or rub  MS: extremities normal- no gross deformities noted  NEURO: Normal strength and tone, mentation intact and speech normal    Diagnostic test results:  none        ASSESSMENT/PLAN:                                                    1. Asymptomatic stenosis of right carotid artery- s/p carotid stenting  Given the description of post procedure hemodynamics he likely had over stimulation of the carotid baroreceptors from the stenting- this is not uncommon and is the leading cause of hemodynamic instability post operatively.   Now- is BP has rebounded , some due to a lower dose and not taking the med today. We need to lower this but given his hypoperfusion stroke like sx when his BP were < 120 we should be cautious and go slow.  Will restart him on the full dose of the lisinopril (20 mg)- f/u with his PCP next week to titrate this further      2. . Essential hypertension, benign  See above- go to 20 mg   - lisinopril (PRINIVIL/ZESTRIL) 10 MG tablet; Take 2 tablets (20 mg) by mouth daily  Dispense: 90 tablet; Refill: 0      3. Cerebral infarction due to occlusion of left internal carotid artery (H)  con't statin, improve BP , con't plavix.     Andrew Palmer MD  [facility]

## 2017-10-11 NOTE — MR AVS SNAPSHOT
"              After Visit Summary   10/11/2017    Adebayo Sierra    MRN: [id number]           Patient Information     Date Of Birth          1955        Visit Information        Provider Department      10/11/2017 8:40 AM Andrew Palmer MD [facility]        Today's Diagnoses     Cerebrovascular accident (CVA), unspecified mechanism (H)    -  1    Essential hypertension, benign           Follow-ups after your visit        Who to contact     If you have questions or need follow up information about today's clinic visit or your schedule please contact [facility] directly at [phone].  Normal or non-critical lab and imaging results will be communicated to you by Dyd[employer], letter or phone within 4 business days after the clinic has received the results. If you do not hear from us within 7 days, please contact the clinic through Dydrahart or phone. If you have a critical or abnormal lab result, we will notify you by phone as soon as possible.  Submit refill requests through [employer] or call your pharmacy and they will forward the refill request to us. Please allow 3 business days for your refill to be completed.          Additional Information About Your Visit        MyChart Information     [employer] lets you send messages to your doctor, view your test results, renew your prescriptions, schedule appointments and more. To sign up, go to [email or web address] . Click on \"Log in\" on the left side of the screen, which will take you to the Welcome page. Then click on \"Sign up Now\" on the right side of the page.     You will be asked to enter the access code listed below, as well as some personal information. Please follow the directions to create your username and password.     Your access code is: [email or web address]  Expires: 2017  3:24 PM     Your access code will  in 90 days. If you need help or a new code, please call your [facility] or " [phone].        Care EveryWhere ID     This is your Care EveryWhere ID. This could be used by other organizations to access your [place] medical records  [id number]        Your Vitals Were     Pulse Temperature Pulse Oximetry BMI (Body Mass Index)          79 98.1  F (36.7  C) (Oral) 99% 28.57 kg/m2         Blood Pressure from Last 3 Encounters:   10/11/17 (!) 218/100   09/25/17 152/82   09/13/17 (!) 175/92    Weight from Last 3 Encounters:   10/11/17 187 lb 14.4 oz (85.2 kg)   09/25/17 186 lb 11.2 oz (84.7 kg)   09/13/17 182 lb 12.8 oz (82.9 kg)              Today, you had the following     No orders found for display         Where to get your medicines      Some of these will need a paper prescription and others can be bought over the counter.  Ask your nurse if you have questions.     You don't need a prescription for these medications     lisinopril 10 MG tablet          Primary Care Provider Office Phone # Fax #    Kobe Goodwin [phone]       [address] [facility] [address]        Equal Access to Services     DOC BUTLER AH: Hadii orin ku hadasho Soomaali, waaxda luqadaha, qaybta kaalmada adeegyada, rochelle perla. So [facility] [phone].    ATENCIÓN: Si habla español, tiene a costa disposición servicios gratuitos de asistencia lingüística. Llame al [phone].    We comply with applicable federal civil rights laws and [place] laws. We do not discriminate on the basis of race, color, national origin, age, disability, sex, sexual orientation, or gender identity.            Thank you!     Thank you for choosing [facility]  for your care. Our goal is always to provide you with excellent care. Hearing back from our patients is one way we can continue to improve our services. Please take a few minutes to complete the written survey that you may receive in the mail after your visit with us. Thank you!             Your Updated  Medication List - Protect others around you: Learn how to safely use, store and throw away your medicines at www.disposemymeds.org.          This list is accurate as of: 10/11/17  9:48 AM.  Always use your most recent med list.                   Brand Name Dispense Instructions for use Diagnosis    aspirin 81 MG chewable tablet      Take 81 mg by mouth daily        atorvastatin 10 MG tablet    LIPITOR     Take 10 mg by mouth At Bedtime        clopidogrel 75 MG tablet    PLAVIX     Take 150 mg by mouth daily        famotidine 20 MG tablet    PEPCID     Take 1 tablet by mouth 2 times daily        lisinopril 10 MG tablet    PRINIVIL/ZESTRIL    90 tablet    Take 2 tablets (20 mg) by mouth daily    Essential hypertension, benign, Cerebrovascular accident (CVA), unspecified mechanism (H)

## 2017-10-31 ENCOUNTER — OFFICE VISIT (OUTPATIENT)
Dept: INTERNAL MEDICINE | Facility: CLINIC | Age: 62
End: 2017-10-31
Payer: COMMERCIAL

## 2017-10-31 VITALS
WEIGHT: 182.1 LBS | DIASTOLIC BLOOD PRESSURE: 92 MMHG | BODY MASS INDEX: 27.69 KG/M2 | HEART RATE: 98 BPM | OXYGEN SATURATION: 99 % | TEMPERATURE: 97.9 F | SYSTOLIC BLOOD PRESSURE: 188 MMHG

## 2017-10-31 DIAGNOSIS — I63.9 CEREBROVASCULAR ACCIDENT (CVA), UNSPECIFIED MECHANISM (H): ICD-10-CM

## 2017-10-31 DIAGNOSIS — I10 ESSENTIAL HYPERTENSION, BENIGN: Primary | ICD-10-CM

## 2017-10-31 DIAGNOSIS — J06.9 UPPER RESPIRATORY TRACT INFECTION, UNSPECIFIED TYPE: ICD-10-CM

## 2017-10-31 PROCEDURE — 99214 OFFICE O/P EST MOD 30 MIN: CPT | Performed by: INTERNAL MEDICINE

## 2017-10-31 RX ORDER — LISINOPRIL 20 MG/1
20 TABLET ORAL 2 TIMES DAILY
Qty: 180 TABLET | Refills: 3 | Status: SHIPPED | OUTPATIENT
Start: 2017-10-31 | End: 2018-07-25 | Stop reason: DRUGHIGH

## 2017-10-31 RX ORDER — LISINOPRIL 10 MG/1
20 TABLET ORAL DAILY
Qty: 90 TABLET | Refills: 0 | Status: CANCELLED | OUTPATIENT
Start: 2017-10-31

## 2017-10-31 NOTE — MR AVS SNAPSHOT
"              After Visit Summary   10/31/2017    Adebayo Sierra    MRN: [id number]           Patient Information     Date Of Birth          1955        Visit Information        Provider Department      10/31/2017 11:40 AM Kobe Goodwin MD [facility]        Today's Diagnoses     Essential hypertension, benign        Cerebrovascular accident (CVA), unspecified mechanism (H)           Follow-ups after your visit        Who to contact     If you have questions or need follow up information about today's clinic visit or your schedule please contact [facility] directly at [phone].  Normal or non-critical lab and imaging results will be communicated to you by MyChart, letter or phone within 4 business days after the clinic has received the results. If you do not hear from us within 7 days, please contact the clinic through [employer] or phone. If you have a critical or abnormal lab result, we will notify you by phone as soon as possible.  Submit refill requests through [employer] or call your pharmacy and they will forward the refill request to us. Please allow 3 business days for your refill to be completed.          Additional Information About Your Visit        MyChart Information     [employer] lets you send messages to your doctor, view your test results, renew your prescriptions, schedule appointments and more. To sign up, go to [email or web address] . Click on \"Log in\" on the left side of the screen, which will take you to the Welcome page. Then click on \"Sign up Now\" on the right side of the page.     You will be asked to enter the access code listed below, as well as some personal information. Please follow the directions to create your username and password.     Your access code is: [email or web address]  Expires: 2017  3:24 PM     Your access code will  in 90 days. If you need help or a new code, please call your [facility] or [phone].      "   Care EveryWhere ID     This is your Care EveryWhere ID. This could be used by other organizations to access your [place] medical records  [id number]        Your Vitals Were     Pulse Temperature Pulse Oximetry BMI (Body Mass Index)          98 97.9  F (36.6  C) (Oral) 99% 27.69 kg/m2         Blood Pressure from Last 3 Encounters:   10/31/17 (!) 188/92   10/11/17 (!) 218/100   09/25/17 152/82    Weight from Last 3 Encounters:   10/31/17 182 lb 1.6 oz (82.6 kg)   10/11/17 187 lb 14.4 oz (85.2 kg)   09/25/17 186 lb 11.2 oz (84.7 kg)              Today, you had the following     No orders found for display         Today's Medication Changes          These changes are accurate as of: 10/31/17 12:16 PM.  If you have any questions, ask your nurse or doctor.               These medicines have changed or have updated prescriptions.        Dose/Directions    lisinopril 20 MG tablet   Commonly known as:  PRINIVIL/ZESTRIL   This may have changed:    - medication strength  - when to take this   Used for:  Essential hypertension, benign   Changed by:  Kobe Goodwin MD        Dose:  20 mg   Take 1 tablet (20 mg) by mouth 2 times daily   Quantity:  180 tablet   Refills:  3            Where to get your medicines      These medications were sent to [facility] [address] [place] [address] [address] AT [facility] [address]  [address] [facility] [address]     Phone:  [phone]     lisinopril 20 MG tablet                Primary Care Provider Office Phone # Fax #    Kobe Goodwin [phone]       [address] [place] [address]        Equal Access to Services     DOC BUTLER : Bertha garg Soabraham, wamansida luqadaha, qaybta kaalmada adegraysonyada, rochelle perla. So [facility] [phone].    ATENCIÓN: Si habla español, tiene a costa disposición servicios gratuitos de asistencia lingüística. Llame al [phone].    We comply with applicable  federal civil rights laws and [place] laws. We do not discriminate on the basis of race, color, national origin, age, disability, sex, sexual orientation, or gender identity.            Thank you!     Thank you for choosing [facility]  for your care. Our goal is always to provide you with excellent care. Hearing back from our patients is one way we can continue to improve our services. Please take a few minutes to complete the written survey that you may receive in the mail after your visit with us. Thank you!             Your Updated Medication List - Protect others around you: Learn how to safely use, store and throw away your medicines at www.disposemymeds.org.          This list is accurate as of: 10/31/17 12:16 PM.  Always use your most recent med list.                   Brand Name Dispense Instructions for use Diagnosis    aspirin 81 MG chewable tablet      Take 81 mg by mouth daily        atorvastatin 10 MG tablet    LIPITOR     Take 10 mg by mouth At Bedtime        clopidogrel 75 MG tablet    PLAVIX     Take 150 mg by mouth daily        famotidine 20 MG tablet    PEPCID     Take 1 tablet by mouth 2 times daily        lisinopril 20 MG tablet    PRINIVIL/ZESTRIL    180 tablet    Take 1 tablet (20 mg) by mouth 2 times daily    Essential hypertension, benign

## 2017-10-31 NOTE — PROGRESS NOTES
SUBJECTIVE:   Adebayo Sierra is a 61 year old male who presents to clinic today for the following health issues:      Hypertension Follow-up      Outpatient blood pressures are being checked at home.  Results are elevated.    Low Salt Diet: low salt        Amount of exercise or physical activity: 2-3 days/week for an average of 45-60 minutes    Problems taking medications regularly: No    Medication side effects: none    Diet: regular (no restrictions)    RESPIRATORY SYMPTOMS      Duration: 2 weeks    Description  nasal congestion, rhinorrhea, facial pain/pressure, cough, fever, chills, decreased hearing and myalgias    Severity: moderate    Accompanying signs and symptoms: None    History (predisposing factors):  Hx of  tobacco abuse    Precipitating or alleviating factors: None    Therapies tried and outcome:  Tylenol- slight relief. Would like to discuss what cold medicines can be used         Problem list and histories reviewed & adjusted, as indicated.  Additional history: as documented    Patient Active Problem List   Diagnosis     Counseling regarding advanced directives     Cerebrovascular accident (CVA), unspecified mechanism (H)     Essential hypertension, benign     Hyperlipidemia LDL goal <100     Tobacco abuse     Cerebral infarction due to occlusion of left internal carotid artery (H)     Asymptomatic stenosis of right carotid artery     History reviewed. No pertinent surgical history.    Social History   Substance Use Topics     Smoking status: Former Smoker     Years: 43.00     Types: Cigarettes     Smokeless tobacco: Never Used     Alcohol use Yes      Comment: 3-4 beers daily      History reviewed. No pertinent family history.      Current Outpatient Prescriptions   Medication Sig Dispense Refill     lisinopril (PRINIVIL/ZESTRIL) 10 MG tablet Take 2 tablets (20 mg) by mouth daily 90 tablet 0     clopidogrel (PLAVIX) 75 MG tablet Take 150 mg by mouth daily   3     famotidine (PEPCID) 20 MG tablet  Take 1 tablet by mouth 2 times daily  3     atorvastatin (LIPITOR) 10 MG tablet Take 10 mg by mouth At Bedtime       aspirin 81 MG chewable tablet Take 81 mg by mouth daily       Not on File  BP Readings from Last 3 Encounters:   10/31/17 (!) 188/92   10/11/17 (!) 218/100   09/25/17 152/82    Wt Readings from Last 3 Encounters:   10/31/17 182 lb 1.6 oz (82.6 kg)   10/11/17 187 lb 14.4 oz (85.2 kg)   09/25/17 186 lb 11.2 oz (84.7 kg)           Reviewed and updated as needed this visit by clinical staffTobacco  Allergies  Med Hx  Surg Hx  Fam Hx  Soc Hx      Reviewed and updated as needed this visit by Provider         ROS:  C: NEGATIVE for fever, chills, change in weight  E/M: NEGATIVE for ear, mouth and throat problems  R: NEGATIVE for significant cough or SOB  CV: NEGATIVE for chest pain, palpitations or peripheral edema  GI: NEGATIVE for nausea, abdominal pain, heartburn, or change in bowel habits  : NEGATIVE for frequency, dysuria, or hematuria  M: NEGATIVE for significant arthralgias or myalgia  H: NEGATIVE for bleeding problems  P: NEGATIVE for changes in mood or affect    OBJECTIVE:                                                    BP (!) 188/92  Pulse 98  Temp 97.9  F (36.6  C) (Oral)  Wt 182 lb 1.6 oz (82.6 kg)  SpO2 99%  BMI 27.69 kg/m2  Body mass index is 27.69 kg/(m^2).  GENERAL: healthy, alert and no distress  RESP: lungs clear to auscultation - no rales, no rhonchi, no wheezes  CV: regular rates and rhythm, normal S1 S2, no S3 or S4 and no click or rub   ABDOMEN: soft, no tenderness, no  hepatosplenomegaly, no masses, normal bowel sounds  MS: extremities- no gross deformities noted  NEURO: no focal changes noted  PSYCH: Alert and oriented times 3; speech- coherent , normal rate and volume; able to articulate logical thoughts, able to abstract reason, no tangential thoughts, no hallucinations or delusions, affect- normal         ASSESSMENT/PLAN:                                                       (I10) Essential hypertension, benign  Comment: will need to lower BP but consider recent CVA not to low or quickly thus advised 20mg po BID but to split evening dose to 10mg to see response thus to start 20/10mg BID, BP check 6 weeks  Plan: lisinopril (PRINIVIL/ZESTRIL) 20 MG tablet            (I63.9) Cerebrovascular accident (CVA), unspecified mechanism (H)  Comment: appears stable on therapy, resolved, no changes.  Plan:     (J06.9) Upper respiratory tract infection, unspecified type  (primary encounter diagnosis)  Comment: appears as viral with no obvious bacterial etiology  Plan: supportive care discussed      See Patient Instructions    Kobe Goodwin MD  [facility]    THE MEDICATION LIST HAS BEEN FULLY RECONCILED BY THE M.D. AND THE NURSING STAFF.    25 minutes spent with this patient, face to face, discussing treatment options for listed problems above as well as side effects of appropriate medications.  Counseling time extended beyond 50% of the clinic visit.  Medication dosing, treatment plan and follow-up were discussed. Also reviewed need for primary care testing for patient.

## 2017-10-31 NOTE — NURSING NOTE
"Chief Complaint   Patient presents with     Hypertension     URI       Initial BP (!) 188/92  Pulse 98  Temp 97.9  F (36.6  C) (Oral)  Wt 182 lb 1.6 oz (82.6 kg)  SpO2 99%  BMI 27.69 kg/m2 Estimated body mass index is 27.69 kg/(m^2) as calculated from the following:    Height as of 9/25/17: 5' 8\" (1.727 m).    Weight as of this encounter: 182 lb 1.6 oz (82.6 kg).  Medication Reconciliation: complete   Enma Gonzalez, KEITH      "

## 2017-11-01 DIAGNOSIS — E78.5 HYPERLIPIDEMIA LDL GOAL <100: Primary | ICD-10-CM

## 2017-11-01 RX ORDER — ATORVASTATIN CALCIUM 10 MG/1
TABLET, FILM COATED ORAL
Qty: 90 TABLET | Refills: 3 | Status: SHIPPED | OUTPATIENT
Start: 2017-11-01 | End: 2018-10-17

## 2017-12-11 ENCOUNTER — OFFICE VISIT (OUTPATIENT)
Dept: INTERNAL MEDICINE | Facility: CLINIC | Age: 62
End: 2017-12-11
Payer: COMMERCIAL

## 2017-12-11 VITALS
BODY MASS INDEX: 28.1 KG/M2 | SYSTOLIC BLOOD PRESSURE: 228 MMHG | OXYGEN SATURATION: 97 % | WEIGHT: 184.8 LBS | TEMPERATURE: 97.7 F | DIASTOLIC BLOOD PRESSURE: 108 MMHG | HEART RATE: 106 BPM

## 2017-12-11 DIAGNOSIS — I63.232: ICD-10-CM

## 2017-12-11 DIAGNOSIS — I10 ESSENTIAL HYPERTENSION, BENIGN: Primary | ICD-10-CM

## 2017-12-11 PROCEDURE — 99214 OFFICE O/P EST MOD 30 MIN: CPT | Performed by: INTERNAL MEDICINE

## 2017-12-11 RX ORDER — METOPROLOL TARTRATE 25 MG/1
25 TABLET, FILM COATED ORAL 2 TIMES DAILY
Qty: 180 TABLET | Refills: 3 | Status: SHIPPED | OUTPATIENT
Start: 2017-12-11 | End: 2017-12-27 | Stop reason: DRUGHIGH

## 2017-12-11 NOTE — NURSING NOTE
"Chief Complaint   Patient presents with     Hypertension       Initial BP (!) 228/108  Pulse 106  Temp 97.7  F (36.5  C) (Oral)  Wt 184 lb 12.8 oz (83.8 kg)  SpO2 97%  BMI 28.1 kg/m2 Estimated body mass index is 28.1 kg/(m^2) as calculated from the following:    Height as of 9/25/17: 5' 8\" (1.727 m).    Weight as of this encounter: 184 lb 12.8 oz (83.8 kg).  Medication Reconciliation: complete   Enma Gonzalez CMA      "

## 2017-12-11 NOTE — MR AVS SNAPSHOT
After Visit Summary   12/11/2017    Adebayo Sierra    MRN: [id number]           Patient Information     Date Of Birth          1955        Visit Information        Provider Department      12/11/2017 10:20 AM Kobe Goodwin MD [facility]        Today's Diagnoses     Essential hypertension, benign    -  1    Cerebral infarction due to occlusion of left internal carotid artery (H)           Follow-ups after your visit        Follow-up notes from your care team     Return in about 2 weeks (around 12/25/2017) for BP Recheck.      Your next 10 appointments already scheduled     Dec 27, 2017  8:40 AM CST   [facility]    [address] [address] [phone]           Bring a current list of meds and any records pertaining to this visit. For Physicals, please bring immunization records and any forms needing to be filled out. Please arrive 10 minutes early to complete paperwork.              Who to contact     If you have questions or need follow up information about today's clinic visit or your schedule please contact [facility] directly at [phone].  Normal or non-critical lab and imaging results will be communicated to you by MyChart, letter or phone within 4 business days after the clinic has received the results. If you do not hear from us within 7 days, please contact the clinic through MyChart or phone. If you have a critical or abnormal lab result, we will notify you by phone as soon as possible.  Submit refill requests through [employer] or call your pharmacy and they will forward the refill request to us. Please allow 3 business days for your refill to be completed.          Additional Information About Your Visit        MyChart Information     [employer] gives you secure access to your electronic health record. If you  see a primary care provider, you can also send messages to your care team and make appointments. If you have questions, please call your primary care clinic.  If you do not have a primary care provider, please call [phone] and they will assist you.        Care EveryWhere ID     This is your Care EveryWhere ID. This could be used by other organizations to access your [place] medical records  [id number]        Your Vitals Were     Pulse Temperature Pulse Oximetry BMI (Body Mass Index)          106 97.7  F (36.5  C) (Oral) 97% 28.1 kg/m2         Blood Pressure from Last 3 Encounters:   12/11/17 (!) 228/108   10/31/17 (!) 188/92   10/11/17 (!) 218/100    Weight from Last 3 Encounters:   12/11/17 184 lb 12.8 oz (83.8 kg)   10/31/17 182 lb 1.6 oz (82.6 kg)   10/11/17 187 lb 14.4 oz (85.2 kg)              Today, you had the following     No orders found for display         Today's Medication Changes          These changes are accurate as of: 12/11/17 10:48 AM.  If you have any questions, ask your nurse or doctor.               Start taking these medicines.        Dose/Directions    metoprolol 25 MG tablet   Commonly known as:  LOPRESSOR   Used for:  Essential hypertension, benign, Cerebral infarction due to occlusion of left internal carotid artery (H)   Started by:  Kobe Goodwin MD        Dose:  25 mg   Take 1 tablet (25 mg) by mouth 2 times daily   Quantity:  180 tablet   Refills:  3            Where to get your medicines      These medications were sent to [facility] [address] [place] [address] AVE [address] AT [facility] [address]  [address] [facility] [address]     Phone:  [phone]     metoprolol 25 MG tablet                Primary Care Provider Office Phone # Fax #    Kobe Goodwin [phone]       [address] [facility] [address]        Equal Access to Services     DOC BUTLER AH: Bertha Hou, ana laura kumar, jaquan  rochelle engelgrayson brown ah. So [facility] [phone].    ATENCIÓN: Si bernadette pimentel, tiene a costa disposición servicios gratuitos de asistencia lingüística. Chad al [phone].    We comply with applicable federal civil rights laws and [place] laws. We do not discriminate on the basis of race, color, national origin, age, disability, sex, sexual orientation, or gender identity.            Thank you!     Thank you for choosing [facility]  for your care. Our goal is always to provide you with excellent care. Hearing back from our patients is one way we can continue to improve our services. Please take a few minutes to complete the written survey that you may receive in the mail after your visit with us. Thank you!             Your Updated Medication List - Protect others around you: Learn how to safely use, store and throw away your medicines at www.disposemymeds.org.          This list is accurate as of: 12/11/17 10:48 AM.  Always use your most recent med list.                   Brand Name Dispense Instructions for use Diagnosis    aspirin 81 MG chewable tablet      Take 81 mg by mouth daily        atorvastatin 10 MG tablet    LIPITOR    90 tablet    TAKE 1 TABLET BY MOUTH EVERY NIGHT AT BEDTIME    Hyperlipidemia LDL goal <100       clopidogrel 75 MG tablet    PLAVIX     Take 150 mg by mouth daily        famotidine 20 MG tablet    PEPCID     Take 1 tablet by mouth 2 times daily        lisinopril 20 MG tablet    PRINIVIL/ZESTRIL    180 tablet    Take 1 tablet (20 mg) by mouth 2 times daily    Essential hypertension, benign       metoprolol 25 MG tablet    LOPRESSOR    180 tablet    Take 1 tablet (25 mg) by mouth 2 times daily    Essential hypertension, benign, Cerebral infarction due to occlusion of left internal carotid artery (H)

## 2017-12-11 NOTE — PROGRESS NOTES
SUBJECTIVE:   Adebayo Sierra is a 62 year old male who presents to clinic today for the following health issues:    Hypertension Follow-up      Outpatient blood pressures are being checked at home.  Results are 220/100's.    Low Salt Diet: low salt        Amount of exercise or physical activity: 2-3 days/week for an average of 45-60 minutes    Problems taking medications regularly: No    Medication side effects: none    Diet: regular (no restrictions)      Problem list and histories reviewed & adjusted, as indicated.  Additional history: as documented    Patient Active Problem List   Diagnosis     Counseling regarding advanced directives     Cerebrovascular accident (CVA), unspecified mechanism (H)     Essential hypertension, benign     Hyperlipidemia LDL goal <100     Tobacco abuse     Cerebral infarction due to occlusion of left internal carotid artery (H)     Asymptomatic stenosis of right carotid artery     No past surgical history on file.    Social History   Substance Use Topics     Smoking status: Former Smoker     Years: 43.00     Types: Cigarettes     Smokeless tobacco: Never Used     Alcohol use Yes      Comment: 3-4 beers daily      No family history on file.      Current Outpatient Prescriptions   Medication Sig Dispense Refill     atorvastatin (LIPITOR) 10 MG tablet TAKE 1 TABLET BY MOUTH EVERY NIGHT AT BEDTIME 90 tablet 3     lisinopril (PRINIVIL/ZESTRIL) 20 MG tablet Take 1 tablet (20 mg) by mouth 2 times daily 180 tablet 3     clopidogrel (PLAVIX) 75 MG tablet Take 150 mg by mouth daily   3     famotidine (PEPCID) 20 MG tablet Take 1 tablet by mouth 2 times daily  3     aspirin 81 MG chewable tablet Take 81 mg by mouth daily       Not on File  BP Readings from Last 3 Encounters:   10/31/17 (!) 188/92   10/11/17 (!) 218/100   09/25/17 152/82    Wt Readings from Last 3 Encounters:   10/31/17 182 lb 1.6 oz (82.6 kg)   10/11/17 187 lb 14.4 oz (85.2 kg)   09/25/17 186 lb 11.2 oz (84.7 kg)         Labs  reviewed in EPIC    Reviewed and updated as needed this visit by clinical staff     Reviewed and updated as needed this visit by Provider         ROS:  C: NEGATIVE for fever, chills, change in weight  E/M: NEGATIVE for ear, mouth and throat problems  R: NEGATIVE for significant cough or SOB  CV: NEGATIVE for chest pain, palpitations or peripheral edema  GI: NEGATIVE for nausea, abdominal pain, heartburn, or change in bowel habits  : NEGATIVE for frequency, dysuria, or hematuria  M: NEGATIVE for significant arthralgias or myalgia  H: NEGATIVE for bleeding problems  P: NEGATIVE for changes in mood or affect    OBJECTIVE:                                                    BP (!) 228/108  Pulse 106  Temp 97.7  F (36.5  C) (Oral)  Wt 184 lb 12.8 oz (83.8 kg)  SpO2 97%  BMI 28.1 kg/m2 recheck 190/102  There is no height or weight on file to calculate BMI.  GENERAL: healthy, alert and no distress  RESP: lungs clear to auscultation - no rales, no rhonchi, no wheezes  CV: regular rates and rhythm, normal S1 S2, no S3 or S4 and no click or rub   MS: extremities- no gross deformities noted, no edema  NEURO:  No focal changes  PSYCH: Alert and oriented times 3; speech- coherent , normal rate and volume; able to articulate logical thoughts, able to abstract reason, no tangential thoughts, no hallucinations or delusions, affect- normal       ASSESSMENT/PLAN:                                                      (I10) Essential hypertension, benign  (primary encounter diagnosis)  Comment: poorly controlled with noted risk factors, advised trial of Metoprolol as add on therapy, BID dosing, noted mild tachycardia alos.   Plan: metoprolol (LOPRESSOR) 25 MG tablet        BP check in clinic in 2 weeks with me    (I63.232) Cerebral infarction due to occlusion of left internal carotid artery (H)  Comment: stable w/o residual issues, Plavix for 3 months then likely ASA.  Plan: metoprolol (LOPRESSOR) 25 MG tablet          See Patient  Instructions    Kobe Goodwin MD  [facility]    25 minutes spent with this patient, face to face, discussing treatment options for listed problems above as well as side effects of appropriate medications.  Counseling time extended beyond 50% of the clinic visit.  Medication dosing, treatment plan and follow-up were discussed. Also reviewed need for primary care testing for patient.

## 2017-12-27 ENCOUNTER — OFFICE VISIT (OUTPATIENT)
Dept: INTERNAL MEDICINE | Facility: CLINIC | Age: 62
End: 2017-12-27
Payer: COMMERCIAL

## 2017-12-27 VITALS
WEIGHT: 188 LBS | DIASTOLIC BLOOD PRESSURE: 104 MMHG | OXYGEN SATURATION: 98 % | HEART RATE: 76 BPM | BODY MASS INDEX: 28.59 KG/M2 | SYSTOLIC BLOOD PRESSURE: 220 MMHG | TEMPERATURE: 98 F

## 2017-12-27 DIAGNOSIS — I10 ESSENTIAL HYPERTENSION, BENIGN: Primary | ICD-10-CM

## 2017-12-27 DIAGNOSIS — I63.232: ICD-10-CM

## 2017-12-27 DIAGNOSIS — Z72.0 TOBACCO ABUSE: ICD-10-CM

## 2017-12-27 PROCEDURE — 99214 OFFICE O/P EST MOD 30 MIN: CPT | Performed by: INTERNAL MEDICINE

## 2017-12-27 RX ORDER — METOPROLOL TARTRATE 25 MG/1
50 TABLET, FILM COATED ORAL 2 TIMES DAILY
Qty: 180 TABLET | Refills: 3 | Status: CANCELLED | OUTPATIENT
Start: 2017-12-27

## 2017-12-27 RX ORDER — AMLODIPINE BESYLATE 5 MG/1
5 TABLET ORAL DAILY
Qty: 90 TABLET | Refills: 3 | Status: SHIPPED | OUTPATIENT
Start: 2017-12-27 | End: 2018-02-28 | Stop reason: DRUGHIGH

## 2017-12-27 RX ORDER — METOPROLOL TARTRATE 50 MG
50 TABLET ORAL 2 TIMES DAILY
Qty: 180 TABLET | Refills: 1 | Status: SHIPPED | OUTPATIENT
Start: 2017-12-27 | End: 2018-04-24

## 2017-12-27 NOTE — NURSING NOTE
"Chief Complaint   Patient presents with     Hypertension     Joint Pain       Initial BP (!) 220/104  Pulse 76  Temp 98  F (36.7  C) (Oral)  Wt 188 lb (85.3 kg)  SpO2 98%  BMI 28.59 kg/m2 Estimated body mass index is 28.59 kg/(m^2) as calculated from the following:    Height as of 9/25/17: 5' 8\" (1.727 m).    Weight as of this encounter: 188 lb (85.3 kg).  Medication Reconciliation: complete   Enma Gonzalez, KEITH      "

## 2017-12-27 NOTE — MR AVS SNAPSHOT
After Visit Summary   12/27/2017    Adebayo Sierra    MRN: [id number]           Patient Information     Date Of Birth          1955        Visit Information        Provider Department      12/27/2017 8:40 AM Kobe Goodwin MD [facility]        Today's Diagnoses     Essential hypertension, benign    -  1    Cerebral infarction due to occlusion of left internal carotid artery (H)        Tobacco abuse           Follow-ups after your visit        Who to contact     If you have questions or need follow up information about today's clinic visit or your schedule please contact [facility] directly at [phone].  Normal or non-critical lab and imaging results will be communicated to you by Codyhart, letter or phone within 4 business days after the clinic has received the results. If you do not hear from us within 7 days, please contact the clinic through Codyhart or phone. If you have a critical or abnormal lab result, we will notify you by phone as soon as possible.  Submit refill requests through Votizen or call your pharmacy and they will forward the refill request to us. Please allow 3 business days for your refill to be completed.          Additional Information About Your Visit        MyChart Information     Votizen gives you secure access to your electronic health record. If you see a primary care provider, you can also send messages to your care team and make appointments. If you have questions, please call your primary care clinic.  If you do not have a primary care provider, please call [phone] and they will assist you.        Care EveryWhere ID     This is your Care EveryWhere ID. This could be used by other organizations to access your [place] medical records  [id number]        Your Vitals Were     Pulse Temperature Pulse Oximetry BMI (Body Mass Index)          76 98  F (36.7  C) (Oral) 98% 28.59 kg/m2         Blood Pressure from  Last 3 Encounters:   12/27/17 (!) 220/104   12/11/17 (!) 228/108   10/31/17 (!) 188/92    Weight from Last 3 Encounters:   12/27/17 188 lb (85.3 kg)   12/11/17 184 lb 12.8 oz (83.8 kg)   10/31/17 182 lb 1.6 oz (82.6 kg)              Today, you had the following     No orders found for display         Today's Medication Changes          These changes are accurate as of: 12/27/17  8:59 AM.  If you have any questions, ask your nurse or doctor.               Start taking these medicines.        Dose/Directions    amLODIPine 5 MG tablet   Commonly known as:  NORVASC   Used for:  Essential hypertension, benign   Started by:  Kobe Goodwin MD        Dose:  5 mg   Take 1 tablet (5 mg) by mouth daily   Quantity:  90 tablet   Refills:  3         These medicines have changed or have updated prescriptions.        Dose/Directions    metoprolol 50 MG tablet   Commonly known as:  LOPRESSOR   This may have changed:    - medication strength  - how much to take   Used for:  Essential hypertension, benign   Changed by:  Kobe Goodwin MD        Dose:  50 mg   Take 1 tablet (50 mg) by mouth 2 times daily   Quantity:  180 tablet   Refills:  1            Where to get your medicines      [facility] [id number] [place] [address] AT [facility] [address]  [address] [place] [id number]     Phone:  [phone]     amLODIPine 5 MG tablet    metoprolol 50 MG tablet                Primary Care Provider Office Phone # Fax #    Kobe Goodwin [phone]       [address] [place] [id number]        Equal Access to Services     ASTER BUTLER AH: Hadii orin garg Soabraham, waaxda luqadaha, qaybta kaalmada adeegyada, rochelle perla. So [facility] [phone].    ATENCIÓN: Si habla español, tiene a costa disposición servicios gratuitos de asistencia lingüística. Llame al [phone].    We comply with applicable federal  civil rights laws and [place] laws. We do not discriminate on the basis of race, color, national origin, age, disability, sex, sexual orientation, or gender identity.            Thank you!     Thank you for choosing [facility]  for your care. Our goal is always to provide you with excellent care. Hearing back from our patients is one way we can continue to improve our services. Please take a few minutes to complete the written survey that you may receive in the mail after your visit with us. Thank you!             Your Updated Medication List - Protect others around you: Learn how to safely use, store and throw away your medicines at www.disposemymeds.org.          This list is accurate as of: 12/27/17  8:59 AM.  Always use your most recent med list.                   Brand Name Dispense Instructions for use Diagnosis    amLODIPine 5 MG tablet    NORVASC    90 tablet    Take 1 tablet (5 mg) by mouth daily    Essential hypertension, benign       aspirin 81 MG chewable tablet      Take 81 mg by mouth daily        atorvastatin 10 MG tablet    LIPITOR    90 tablet    TAKE 1 TABLET BY MOUTH EVERY NIGHT AT BEDTIME    Hyperlipidemia LDL goal <100       clopidogrel 75 MG tablet    PLAVIX     Take 150 mg by mouth daily        famotidine 20 MG tablet    PEPCID     Take 1 tablet by mouth 2 times daily        lisinopril 20 MG tablet    PRINIVIL/ZESTRIL    180 tablet    Take 1 tablet (20 mg) by mouth 2 times daily    Essential hypertension, benign       metoprolol 50 MG tablet    LOPRESSOR    180 tablet    Take 1 tablet (50 mg) by mouth 2 times daily    Essential hypertension, benign

## 2017-12-27 NOTE — PROGRESS NOTES
SUBJECTIVE:   Adebayo Sierra is a 62 year old male who presents to clinic today for the following health issues:      Hypertension Follow-up      Outpatient blood pressures are not being checked.    Low Salt Diet: low salt    Amount of exercise or physical activity: 2-3 days/week for an average of 45-60 minutes    Problems taking medications regularly: No    Medication side effects: muscle aches in legs    Diet: low salt      Problem list and histories reviewed & adjusted, as indicated.  Additional history: as documented    Patient Active Problem List   Diagnosis     Counseling regarding advanced directives     Essential hypertension, benign     Hyperlipidemia LDL goal <100     Tobacco abuse     Cerebral infarction due to occlusion of left internal carotid artery (H)     Asymptomatic stenosis of right carotid artery     History reviewed. No pertinent surgical history.    Social History   Substance Use Topics     Smoking status: Former Smoker     Years: 43.00     Types: Cigarettes     Smokeless tobacco: Never Used     Alcohol use Yes      Comment: 3-4 beers daily      History reviewed. No pertinent family history.      Current Outpatient Prescriptions   Medication Sig Dispense Refill     amLODIPine (NORVASC) 5 MG tablet Take 1 tablet (5 mg) by mouth daily 90 tablet 3     metoprolol (LOPRESSOR) 50 MG tablet Take 1 tablet (50 mg) by mouth 2 times daily 180 tablet 1     atorvastatin (LIPITOR) 10 MG tablet TAKE 1 TABLET BY MOUTH EVERY NIGHT AT BEDTIME 90 tablet 3     lisinopril (PRINIVIL/ZESTRIL) 20 MG tablet Take 1 tablet (20 mg) by mouth 2 times daily 180 tablet 3     clopidogrel (PLAVIX) 75 MG tablet Take 150 mg by mouth daily   3     famotidine (PEPCID) 20 MG tablet Take 1 tablet by mouth 2 times daily  3     aspirin 81 MG chewable tablet Take 81 mg by mouth daily       [DISCONTINUED] metoprolol (LOPRESSOR) 25 MG tablet Take 1 tablet (25 mg) by mouth 2 times daily 180 tablet 3     Not on File  BP Readings from Last  3 Encounters:   12/27/17 (!) 220/104   12/11/17 (!) 228/108   10/31/17 (!) 188/92    Wt Readings from Last 3 Encounters:   12/27/17 188 lb (85.3 kg)   12/11/17 184 lb 12.8 oz (83.8 kg)   10/31/17 182 lb 1.6 oz (82.6 kg)             Reviewed and updated as needed this visit by clinical staffTobacco  Allergies  Med Hx  Surg Hx  Fam Hx  Soc Hx      Reviewed and updated as needed this visit by Provider         ROS:  C: NEGATIVE for fever, chills, change in weight  E/M: NEGATIVE for ear, mouth and throat problems  R: NEGATIVE for significant cough or SOB  CV: NEGATIVE for chest pain, palpitations or peripheral edema  GI: NEGATIVE for nausea, abdominal pain, heartburn, or change in bowel habits  : NEGATIVE for frequency, dysuria, or hematuria  M: NEGATIVE for significant arthralgias or myalgia  H: NEGATIVE for bleeding problems  P: ++ for changes in mood or affect    OBJECTIVE:                                                    BP (!) 220/104  Pulse 76  Temp 98  F (36.7  C) (Oral)  Wt 188 lb (85.3 kg)  SpO2 98%  BMI 28.59 kg/m2  Body mass index is 28.59 kg/(m^2).  GENERAL: alert and no distress  RESP: lungs clear to auscultation - no rales, no rhonchi, no wheezes  CV: regular rates and rhythm, normal S1 S2, no S3 or S4 and  no click or rub   MS: extremities- no gross deformities noted  NEURO: no focal changes  PSYCH: Alert and oriented times 3; speech- coherent , normal rate and volume; able to articulate logical thoughts, able to abstract reason, no tangential thoughts, no hallucinations or delusions, affect- normal       ASSESSMENT/PLAN:                                                      (I10) Essential hypertension, benign  (primary encounter diagnosis)  Comment: I discussed with Ed and his wife that his blood pressure appears to be somewhat resistant.  He does not check his blood pressures at home due to issues of anxiety that he feels exacerbated his blood pressure.  Nonetheless I recommended we  increase his metoprolol to 50 mg twice daily and add amlodipine 5 mg daily continuing with the lisinopril.  I recommended a blood pressure check again in 2 weeks.  Plan: amLODIPine (NORVASC) 5 MG tablet, metoprolol         (LOPRESSOR) 50 MG tablet        I have also advised that if possible he could recheck his blood pressure at home periodically and bring those values with him to the clinic so that I have a better idea about how much of this could be potentially a white coat syndrome exacerbated by his underlying anxiety.  We also discussed treating his anxiety if in fact this is a factor using an SSRI.  He is opted to wait at this point.    (I63.232) Cerebral infarction due to occlusion of left internal carotid artery (H)  Comment: Clinically stable no evidence of recurrent residual functional impairment.  He will consider continue on Plavix as ordered.  Plan:     (Z72.0) Tobacco abuse  Comment: Continued smoking cessation was advised and he has done well and eliminating tobacco.  Plan:     See Patient Instructions    Kobe Goodwin MD  [facility]    25 minutes spent with this patient, face to face, discussing treatment options for listed problems above as well as side effects of appropriate medications.  Counseling time extended beyond 50% of the clinic visit.  Medication dosing, treatment plan and follow-up were discussed. Also reviewed need for primary care testing for patient.

## 2018-01-08 ENCOUNTER — TRANSFERRED RECORDS (OUTPATIENT)
Dept: HEALTH INFORMATION MANAGEMENT | Facility: CLINIC | Age: 63
End: 2018-01-08

## 2018-01-17 ENCOUNTER — OFFICE VISIT (OUTPATIENT)
Dept: INTERNAL MEDICINE | Facility: CLINIC | Age: 63
End: 2018-01-17
Payer: COMMERCIAL

## 2018-01-17 VITALS
HEART RATE: 70 BPM | OXYGEN SATURATION: 97 % | WEIGHT: 186.2 LBS | TEMPERATURE: 97.8 F | HEIGHT: 68 IN | BODY MASS INDEX: 28.22 KG/M2

## 2018-01-17 DIAGNOSIS — Z72.0 TOBACCO ABUSE: ICD-10-CM

## 2018-01-17 DIAGNOSIS — I63.232: ICD-10-CM

## 2018-01-17 DIAGNOSIS — I10 ESSENTIAL HYPERTENSION, BENIGN: Primary | ICD-10-CM

## 2018-01-17 PROCEDURE — 99213 OFFICE O/P EST LOW 20 MIN: CPT | Performed by: INTERNAL MEDICINE

## 2018-01-17 NOTE — MR AVS SNAPSHOT
After Visit Summary   1/17/2018    Adebayo Sierra    MRN: [id number]           Patient Information     Date Of Birth          1955        Visit Information        Provider Department      1/17/2018 9:40 AM Kobe Goodwin MD [facility]        Today's Diagnoses     Essential hypertension, benign    -  1    Cerebral infarction due to occlusion of left internal carotid artery (H)        Tobacco abuse           Follow-ups after your visit        Follow-up notes from your care team     Return in about 3 months (around 4/17/2018), or if symptoms worsen or fail to improve, for BP Recheck.      Your next 10 appointments already scheduled     Apr 17, 2018 10:00 AM CDT   Office Visit with Kobe Goodwin MD   [facility]    [address] [place] [id number] [phone]           Bring a current list of meds and any records pertaining to this visit. For Physicals, please bring immunization records and any forms needing to be filled out. Please arrive 10 minutes early to complete paperwork.              Who to contact     If you have questions or need follow up information about today's clinic visit or your schedule please contact [facility] directly at [phone].  Normal or non-critical lab and imaging results will be communicated to you by MyChart, letter or phone within 4 business days after the clinic has received the results. If you do not hear from us within 7 days, please contact the clinic through Fry Multimediahart or phone. If you have a critical or abnormal lab result, we will notify you by phone as soon as possible.  Submit refill requests through OhmData or call your pharmacy and they will forward the refill request to us. Please allow 3 business days for your refill to be completed.          Additional Information About Your Visit        MyChart Information     OhmData gives  "you secure access to your electronic health record. If you see a primary care provider, you can also send messages to your care team and make appointments. If you have questions, please call your primary care clinic.  If you do not have a primary care provider, please call [phone] and they will assist you.        Care EveryWhere ID     This is your Care EveryWhere ID. This could be used by other organizations to access your [place] medical records  [id number]        Your Vitals Were     Pulse Temperature Height Pulse Oximetry BMI (Body Mass Index)       70 97.8  F (36.6  C) (Oral) 5' 8\" (1.727 m) 97% 28.31 kg/m2        Blood Pressure from Last 3 Encounters:   12/27/17 (!) 220/104   12/11/17 (!) 228/108   10/31/17 (!) 188/92    Weight from Last 3 Encounters:   01/17/18 186 lb 3.2 oz (84.5 kg)   12/27/17 188 lb (85.3 kg)   12/11/17 184 lb 12.8 oz (83.8 kg)              Today, you had the following     No orders found for display       Primary Care Provider Office Phone # Fax #    Kobe Goodwin [phone]       [address] [facility] [address]        Equal Access to Services     DOC BUTLER AH: Hadii aad ku hadasho Soomaali, waaxda luqadaha, qaybta kaalmada adeegyada, waxay idiin hayaan ashlyn brown . So [facility] [phone].    ATENCIÓN: Si habla español, tiene a costa disposición servicios gratuitos de asistencia lingüística. Llame al [phone].    We comply with applicable federal civil rights laws and [place] laws. We do not discriminate on the basis of race, color, national origin, age, disability, sex, sexual orientation, or gender identity.            Thank you!     Thank you for choosing [facility]  for your care. Our goal is always to provide you with excellent care. Hearing back from our patients is one way we can continue to improve our services. Please take a few minutes to complete the written survey that you may receive in the mail after " your visit with us. Thank you!             Your Updated Medication List - Protect others around you: Learn how to safely use, store and throw away your medicines at www.disposemymeds.org.          This list is accurate as of: 1/17/18 10:15 AM.  Always use your most recent med list.                   Brand Name Dispense Instructions for use Diagnosis    amLODIPine 5 MG tablet    NORVASC    90 tablet    Take 1 tablet (5 mg) by mouth daily    Essential hypertension, benign       aspirin 81 MG chewable tablet      Take 81 mg by mouth daily        atorvastatin 10 MG tablet    LIPITOR    90 tablet    TAKE 1 TABLET BY MOUTH EVERY NIGHT AT BEDTIME    Hyperlipidemia LDL goal <100       lisinopril 20 MG tablet    PRINIVIL/ZESTRIL    180 tablet    Take 1 tablet (20 mg) by mouth 2 times daily    Essential hypertension, benign       metoprolol tartrate 50 MG tablet    LOPRESSOR    180 tablet    Take 1 tablet (50 mg) by mouth 2 times daily    Essential hypertension, benign

## 2018-01-17 NOTE — PROGRESS NOTES
SUBJECTIVE:   Adebayo Sierra is a 62 year old male who presents to clinic today for the following health issues:    Hypertension Follow-up    Patient has been taking his medications accordingly.  He states he has been seen by the cardiologist who have stopped his Plavix.    Please note that the patient refused to have his blood pressure checked here in the clinic as he did not want to become anxious and have his blood pressure go up which she feels as a precipitating factor for his elevated systolic blood pressure      He does have a 2 page list and summary of all of his blood pressures dating back to his last clinic visit.  It appears that his overall blood pressures have reduced from ranges of 135 up to 175 with essentially stable diastolic blood pressures in the mid 80s.      Outpatient blood pressures are being checked at home.  Results are 180-150/80-70.    Low Salt Diet: low salt        Amount of exercise or physical activity: None    Problems taking medications regularly: No    Medication side effects: none    Diet: regular (no restrictions)    Problem list and histories reviewed & adjusted, as indicated.  Additional history: as documented    Patient Active Problem List   Diagnosis     Counseling regarding advanced directives     Essential hypertension, benign     Hyperlipidemia LDL goal <100     Tobacco abuse     Cerebral infarction due to occlusion of left internal carotid artery (H)     Asymptomatic stenosis of right carotid artery     History reviewed. No pertinent surgical history.    Social History   Substance Use Topics     Smoking status: Former Smoker     Years: 43.00     Types: Cigarettes     Smokeless tobacco: Never Used     Alcohol use Yes      Comment: 3-4 beers daily      History reviewed. No pertinent family history.      Current Outpatient Prescriptions   Medication Sig Dispense Refill     amLODIPine (NORVASC) 5 MG tablet Take 1 tablet (5 mg) by mouth daily 90 tablet 3     metoprolol  "(LOPRESSOR) 50 MG tablet Take 1 tablet (50 mg) by mouth 2 times daily 180 tablet 1     atorvastatin (LIPITOR) 10 MG tablet TAKE 1 TABLET BY MOUTH EVERY NIGHT AT BEDTIME 90 tablet 3     lisinopril (PRINIVIL/ZESTRIL) 20 MG tablet Take 1 tablet (20 mg) by mouth 2 times daily 180 tablet 3     clopidogrel (PLAVIX) 75 MG tablet Take 150 mg by mouth daily   3     famotidine (PEPCID) 20 MG tablet Take 1 tablet by mouth 2 times daily  3     aspirin 81 MG chewable tablet Take 81 mg by mouth daily       Not on File  BP Readings from Last 3 Encounters:   12/27/17 (!) 220/104   12/11/17 (!) 228/108   10/31/17 (!) 188/92    Wt Readings from Last 3 Encounters:   12/27/17 188 lb (85.3 kg)   12/11/17 184 lb 12.8 oz (83.8 kg)   10/31/17 182 lb 1.6 oz (82.6 kg)           Reviewed and updated as needed this visit by clinical staff     Reviewed and updated as needed this visit by Provider         ROS:  C: NEGATIVE for fever, chills, change in weight  E/M: NEGATIVE for ear, mouth and throat problems  R: NEGATIVE for significant cough or SOB  CV: NEGATIVE for chest pain, palpitations or peripheral edema  GI: NEGATIVE for nausea, abdominal pain, heartburn, or change in bowel habits  : NEGATIVE for frequency, dysuria, or hematuria  M: NEGATIVE for significant arthralgias or myalgia  H: NEGATIVE for bleeding problems  P: NEGATIVE for changes in mood or affect    OBJECTIVE:                                                    Pulse 70  Temp 97.8  F (36.6  C) (Oral)  Ht 5' 8\" (1.727 m)  Wt 186 lb 3.2 oz (84.5 kg)  SpO2 97%  BMI 28.31 kg/m2  There is no height or weight on file to calculate BMI.  GENERAL: healthy, alert and no distress  RESP: lungs clear to auscultation - no rales, no rhonchi, no wheezes  CV: regular rates and rhythm, normal S1 S2, no S3 or S4 and no click or rub   MS: extremities- no gross deformities noted  NEURO: no focal changes  PSYCH: Alert and oriented times 3; speech- coherent , normal rate and volume; able to " articulate logical thoughts, able to abstract reason, no tangential thoughts, no hallucinations or delusions, affect- normal     ASSESSMENT/PLAN:                                                      (I10) Essential hypertension, benign  (primary encounter diagnosis)  Comment: Hypertension currently stable we will continue with the current regimen without change and suggest a repeat blood pressure check in 3 months.  Plan:     (I63.232) Cerebral infarction due to occlusion of left internal carotid artery (H)  Comment: Stable resolved with no residual defect.  Continue with risk modification.  Plan:     (Z72.0) Tobacco abuse  Comment:   Plan: cessation as continued were recommended.      See Patient Instructions    Kobe Goodwin MD  [facility]    THE MEDICATION LIST HAS BEEN FULLY RECONCILED BY THE M.D. AND THE NURSING STAFF.

## 2018-01-31 ENCOUNTER — TELEPHONE (OUTPATIENT)
Dept: INTERNAL MEDICINE | Facility: CLINIC | Age: 63
End: 2018-01-31

## 2018-01-31 NOTE — TELEPHONE ENCOUNTER
Patient has been advised and scheduled for a follow up 2/14/18.  He will check his BP here in the office at the next appointment.

## 2018-01-31 NOTE — TELEPHONE ENCOUNTER
When patient was last here in the clinic he refused to have his blood pressure checked thus clinic blood pressures are not available.  If in fact his blood pressure is consistent with what is reported he may benefit from taking amlodipine 10 mg and then following up here in the clinic with me in a couple weeks to recheck his blood pressure.  He can take 2 5 mg tablets once a day until he is seen.  Please inform patient that we will check his blood pressure when he is here in the clinic

## 2018-01-31 NOTE — TELEPHONE ENCOUNTER
Patient seen recently for BP check.  His reading last evening was 200/86 and then 15 mins later was 160/70, states it has been running in the 150-160's/70-80 since he was seen.       Pt med list is current.  Does he need to be seen or make any med changes?  Please advise.

## 2018-02-14 ENCOUNTER — OFFICE VISIT (OUTPATIENT)
Dept: INTERNAL MEDICINE | Facility: CLINIC | Age: 63
End: 2018-02-14
Payer: COMMERCIAL

## 2018-02-14 VITALS
DIASTOLIC BLOOD PRESSURE: 98 MMHG | WEIGHT: 193 LBS | BODY MASS INDEX: 29.35 KG/M2 | HEART RATE: 93 BPM | OXYGEN SATURATION: 99 % | SYSTOLIC BLOOD PRESSURE: 204 MMHG | TEMPERATURE: 98 F

## 2018-02-14 DIAGNOSIS — I10 ESSENTIAL HYPERTENSION, BENIGN: Primary | ICD-10-CM

## 2018-02-14 DIAGNOSIS — Z72.0 TOBACCO ABUSE: ICD-10-CM

## 2018-02-14 DIAGNOSIS — F41.9 ANXIETY: ICD-10-CM

## 2018-02-14 DIAGNOSIS — I63.232: ICD-10-CM

## 2018-02-14 PROCEDURE — 99214 OFFICE O/P EST MOD 30 MIN: CPT | Performed by: INTERNAL MEDICINE

## 2018-02-14 RX ORDER — HYDROCHLOROTHIAZIDE 12.5 MG/1
12.5 TABLET ORAL
Qty: 180 TABLET | Refills: 3 | Status: SHIPPED | OUTPATIENT
Start: 2018-02-14 | End: 2018-02-28

## 2018-02-14 RX ORDER — CITALOPRAM HYDROBROMIDE 20 MG/1
20 TABLET ORAL DAILY
Qty: 90 TABLET | Refills: 1 | Status: SHIPPED | OUTPATIENT
Start: 2018-02-14 | End: 2018-02-28

## 2018-02-14 NOTE — PROGRESS NOTES
SUBJECTIVE:   Adebayo Sierra is a 62 year old male who presents to clinic today for the following health issues:    Hypertension Follow-up      Outpatient blood pressures are being checked at home.  Results are 200-120/100-80. Varies- patient states he becomes very anxious about taking his BP which causes readings to increase.     Low Salt Diet: low salt      Amount of exercise or physical activity: None    Problems taking medications regularly: NoNone    Medication side effects: - having joint pains in hands and knees, concerned about side effect of statin     Diet: regular (no restrictions)        Problem list and histories reviewed & adjusted, as indicated.  Additional history: as documented    Patient Active Problem List   Diagnosis     Counseling regarding advanced directives     Essential hypertension, benign     Hyperlipidemia LDL goal <100     Tobacco abuse     Cerebral infarction due to occlusion of left internal carotid artery (H)     Asymptomatic stenosis of right carotid artery     History reviewed. No pertinent surgical history.    Social History   Substance Use Topics     Smoking status: Former Smoker     Years: 43.00     Types: Cigarettes     Smokeless tobacco: Never Used     Alcohol use Yes      Comment: 3-4 beers daily      History reviewed. No pertinent family history.      Current Outpatient Prescriptions   Medication Sig Dispense Refill     amLODIPine (NORVASC) 5 MG tablet Take 1 tablet (5 mg) by mouth daily 90 tablet 3     metoprolol (LOPRESSOR) 50 MG tablet Take 1 tablet (50 mg) by mouth 2 times daily 180 tablet 1     atorvastatin (LIPITOR) 10 MG tablet TAKE 1 TABLET BY MOUTH EVERY NIGHT AT BEDTIME 90 tablet 3     lisinopril (PRINIVIL/ZESTRIL) 20 MG tablet Take 1 tablet (20 mg) by mouth 2 times daily 180 tablet 3     aspirin 81 MG chewable tablet Take 81 mg by mouth daily       Not on File  BP Readings from Last 3 Encounters:   12/27/17 (!) 220/104   12/11/17 (!) 228/108   10/31/17 (!) 188/92     Wt Readings from Last 3 Encounters:   01/17/18 186 lb 3.2 oz (84.5 kg)   12/27/17 188 lb (85.3 kg)   12/11/17 184 lb 12.8 oz (83.8 kg)                    Reviewed and updated as needed this visit by clinical staff  Tobacco  Allergies  Meds  Med Hx  Surg Hx  Fam Hx  Soc Hx      Reviewed and updated as needed this visit by Provider         ROS:  C: NEGATIVE for fever, chills, change in weight  E/M: NEGATIVE for ear, mouth and throat problems  R: NEGATIVE for significant cough or SOB  CV: NEGATIVE for chest pain, palpitations or peripheral edema  GI: NEGATIVE for nausea, abdominal pain, heartburn, or change in bowel habits  : NEGATIVE for frequency, dysuria, or hematuria  M: NEGATIVE for significant arthralgias or myalgia  H: NEGATIVE for bleeding problems    OBJECTIVE:                                                    BP (!) 204/98  Pulse 93  Temp 98  F (36.7  C) (Oral)  Wt 193 lb (87.5 kg)  SpO2 99%  BMI 29.35 kg/m2  Body mass index is 29.35 kg/(m^2).  GENERAL: alert and no distress  EYES: Eyes grossly normal to inspection, extraocular movements - intact, and PERRL  HENT: ear canals- normal; TMs- normal; Nose- normal; Mouth- no ulcers, no lesions  NECK: no tenderness, no adenopathy, no asymmetry, no masses, no stiffness; thyroid- normal to palpation  RESP: lungs clear to auscultation - no rales, no rhonchi, no wheezes  CV: regular rates and rhythm, normal S1 S2, no S3 or S4 and no click or rub   MS: extremities- no gross deformities noted  PSYCH: Alert and oriented times 3; speech- coherent , normal rate and volume; able to articulate logical thoughts, able to abstract reason, no tangential thoughts, no hallucinations or delusions, affect- normal     ASSESSMENT/PLAN:                                                      (I10) Essential hypertension, benign  (primary encounter diagnosis)  Comment: Poorly controlled and quite fluctuant.  I suggested a nephrology referral and the addition of  hydrochlorothiazide 12.5 mg by mouth twice daily with each lisinopril dose.  Also suggested a repeat basic metabolic panel with a blood pressure check on 2/28/2018  Plan: NEPHROLOGY ADULT REFERRAL, hydrochlorothiazide         12.5 MG TABS tablet, Basic metabolic panel        The risks of the diuretic in regards to increasing urination frequency and hypokalemia were discussed.    (I63.232) Cerebral infarction due to occlusion of left internal carotid artery (H)  Comment: Noted multiple risk factors goals were discussed with the patient.  Plan: Also advised him to cut back on his alcohol use.    (F41.9) Anxiety  Comment: I feel the patient's diet he is contributing to some of his symptoms.  He has a fairly prominent fight or flight response to daily when it comes to his blood pressure.  Discuss with his wife who also informed me that he is quite anxious at times and notes that his blood pressure can spike quite significantly when this is impacted his level of irritability around the house.  We discussed the issues in regards to treatment observation and in the setting of his blood pressure being so resistant I suggested initiation of an SSRI for short and long-term benefit.  Plan: citalopram (CELEXA) 20 MG tablet        I've explained to him that drugs of the SSRI class can have side effects such as weight gain, sexual dysfunction, insomnia, headache, nausea. These medications are generally effective at alleviating symptoms of anxiety and/or depression. Let me know if significant side effects do occur.      (Z72.0) Tobacco abuse  Comment:   Plan: Smoking cessation was again discussed      See Patient Instructions    Kobe Goodwin MD  [facility]    THE MEDICATION LIST HAS BEEN FULLY RECONCILED BY THE M.D. AND THE NURSING STAFF.    25 minutes spent with this patient, face to face, discussing treatment options for listed problems above as well as side effects of appropriate medications.   Counseling time extended beyond 50% of the clinic visit.  Medication dosing, treatment plan and follow-up were discussed. Also reviewed need for primary care testing for patient.

## 2018-02-14 NOTE — MR AVS SNAPSHOT
After Visit Summary   2/14/2018    Adebayo Sierra    MRN: [id number]           Patient Information     Date Of Birth          1955        Visit Information        Provider Department      2/14/2018 1:40 PM Kobe Goodwin MD [facility]        Today's Diagnoses     Essential hypertension, benign    -  1       Follow-ups after your visit        Additional Services     NEPHROLOGY ADULT REFERRAL       Your provider has referred you to: [facility]: [facility] [phone]   [email or web address]    Please be aware that coverage of these services is subject to the terms and limitations of your health insurance plan.  Call member services at your health plan with any benefit or coverage questions.      Reason for referral:  HTN  Please bring the following to your appointment:    >>   Any x-rays, CTs or MRIs which have been performed.  Contact the facility where they were done to arrange for  prior to your scheduled appointment.   >>   List of current medications   >>   This referral request   >>   Any documents/labs given to you for this referral                  Your next 10 appointments already scheduled     Apr 17, 2018 10:00 AM CDT   Office Visit with Kobe Goodwin MD   [facility] ([facility])    [address] [address] [phone]           Bring a current list of meds and any records pertaining to this visit. For Physicals, please bring immunization records and any forms needing to be filled out. Please arrive 10 minutes early to complete paperwork.              Future tests that were ordered for you today     Open Future Orders        Priority Expected Expires Ordered    Basic metabolic panel Routine 2/14/2018 2/28/2018 2/14/2018            Who to contact     If you have questions or need follow up information about today's clinic visit or your schedule please  contact [facility] directly at [phone].  Normal or non-critical lab and imaging results will be communicated to you by MyChart, letter or phone within 4 business days after the clinic has received the results. If you do not hear from us within 7 days, please contact the clinic through [employer] or phone. If you have a critical or abnormal lab result, we will notify you by phone as soon as possible.  Submit refill requests through [employer] or call your pharmacy and they will forward the refill request to us. Please allow 3 business days for your refill to be completed.          Additional Information About Your Visit        [employer] Information     [employer] gives you secure access to your electronic health record. If you see a primary care provider, you can also send messages to your care team and make appointments. If you have questions, please call your primary care clinic.  If you do not have a primary care provider, please call [phone] and they will assist you.        Care EveryWhere ID     This is your Care EveryWhere ID. This could be used by other organizations to access your [place] medical records  [id number]        Your Vitals Were     Pulse Temperature Pulse Oximetry BMI (Body Mass Index)          93 98  F (36.7  C) (Oral) 99% 29.35 kg/m2         Blood Pressure from Last 3 Encounters:   02/14/18 (!) 204/98   12/27/17 (!) 220/104   12/11/17 (!) 228/108    Weight from Last 3 Encounters:   02/14/18 193 lb (87.5 kg)   01/17/18 186 lb 3.2 oz (84.5 kg)   12/27/17 188 lb (85.3 kg)              We Performed the Following     NEPHROLOGY ADULT REFERRAL          Today's Medication Changes          These changes are accurate as of 2/14/18  1:52 PM.  If you have any questions, ask your nurse or doctor.               Start taking these medicines.        Dose/Directions    hydrochlorothiazide 12.5 MG Tabs tablet   Used for:  Essential hypertension, benign   Started by:  Kobe Goodwin,  MD        Dose:  12.5 mg   Take 1 tablet (12.5 mg) by mouth 2 times daily   Quantity:  180 tablet   Refills:  3            Where to get your medicines      [id number] [address] AT [facility] [address] [facility] [address]     Phone:  [phone]     hydrochlorothiazide 12.5 MG Tabs tablet                Primary Care Provider Office Phone # Fax #    Kobe Goodwin [phone]       [address] [facility] [address]        Equal Access to Services     [facility] : Hadii aad ku hadasho Soomaali, waaxda luqadaha, qaybta kaalmada adeegyada, waxxenia brown . So [facility] [phone].    ATENCIÓN: Si habla español, tiene a costa disposición servicios gratuitos de asistencia lingüística. Llame al [phone].    We comply with applicable federal civil rights laws and [place] laws. We do not discriminate on the basis of race, color, national origin, age, disability, sex, sexual orientation, or gender identity.            Thank you!     Thank you for choosing [facility]  for your care. Our goal is always to provide you with excellent care. Hearing back from our patients is one way we can continue to improve our services. Please take a few minutes to complete the written survey that you may receive in the mail after your visit with us. Thank you!             Your Updated Medication List - Protect others around you: Learn how to safely use, store and throw away your medicines at www.disposemymeds.org.          This list is accurate as of 2/14/18  1:52 PM.  Always use your most recent med list.                   Brand Name Dispense Instructions for use Diagnosis    amLODIPine 5 MG tablet    NORVASC    90 tablet    Take 1 tablet (5 mg) by mouth daily    Essential hypertension, benign       aspirin 81 MG chewable tablet      Take 81 mg by mouth daily         atorvastatin 10 MG tablet    LIPITOR    90 tablet    TAKE 1 TABLET BY MOUTH EVERY NIGHT AT BEDTIME    Hyperlipidemia LDL goal <100       hydrochlorothiazide 12.5 MG Tabs tablet     180 tablet    Take 1 tablet (12.5 mg) by mouth 2 times daily    Essential hypertension, benign       lisinopril 20 MG tablet    PRINIVIL/ZESTRIL    180 tablet    Take 1 tablet (20 mg) by mouth 2 times daily    Essential hypertension, benign       metoprolol tartrate 50 MG tablet    LOPRESSOR    180 tablet    Take 1 tablet (50 mg) by mouth 2 times daily    Essential hypertension, benign

## 2018-02-14 NOTE — NURSING NOTE
"Chief Complaint   Patient presents with     Hypertension       Initial BP (!) 204/98  Pulse 93  Temp 98  F (36.7  C) (Oral)  Wt 193 lb (87.5 kg)  SpO2 99%  BMI 29.35 kg/m2 Estimated body mass index is 29.35 kg/(m^2) as calculated from the following:    Height as of 1/17/18: 5' 8\" (1.727 m).    Weight as of this encounter: 193 lb (87.5 kg).  Medication Reconciliation: complete   Enma Gonzalez, KEITH      "

## 2018-02-19 ENCOUNTER — TELEPHONE (OUTPATIENT)
Dept: INTERNAL MEDICINE | Facility: CLINIC | Age: 63
End: 2018-02-19

## 2018-02-19 NOTE — TELEPHONE ENCOUNTER
Reason for Call:  Other call back    Detailed comments: Pt saw Dr Goodwin 2/14/18.  Dr Goodwin wanted the pt to call to set up an appt with a nephrologist.  The pt tried to set up the appt but was told that Dr Goodwin's office had to set up the appt. Please call the pt back.    Phone Number Patient can be reached at: Cell number on file:    Telephone Information:   Mobile [phone]       Best Time: anytime    Can we leave a detailed message on this number? NO    Call taken on 2/19/2018 at 1:38 PM by EDUARDO MARQUEZ

## 2018-02-19 NOTE — TELEPHONE ENCOUNTER
Spoke with [facility] scheduling who states they are booked out until May/Carla for new patient. Scheduling states that providers typically will work in patients sooner if they speak with patients referring Physician. Dr. Obrien will be calling to speak with Dr. Goodwin about patients case to see if they can work him in sooner. FYI.

## 2018-02-27 ENCOUNTER — TELEPHONE (OUTPATIENT)
Dept: NURSING | Facility: CLINIC | Age: 63
End: 2018-02-27

## 2018-02-27 DIAGNOSIS — I10 ESSENTIAL HYPERTENSION, BENIGN: ICD-10-CM

## 2018-02-27 LAB
ANION GAP SERPL CALCULATED.3IONS-SCNC: 6 MMOL/L (ref 3–14)
BUN SERPL-MCNC: 21 MG/DL (ref 7–30)
CALCIUM SERPL-MCNC: 8.8 MG/DL (ref 8.5–10.1)
CHLORIDE SERPL-SCNC: 100 MMOL/L (ref 94–109)
CO2 SERPL-SCNC: 27 MMOL/L (ref 20–32)
CREAT SERPL-MCNC: 1.51 MG/DL (ref 0.66–1.25)
GFR SERPL CREATININE-BSD FRML MDRD: 47 ML/MIN/1.7M2
GLUCOSE SERPL-MCNC: 103 MG/DL (ref 70–99)
POTASSIUM SERPL-SCNC: 4.2 MMOL/L (ref 3.4–5.3)
SODIUM SERPL-SCNC: 133 MMOL/L (ref 133–144)

## 2018-02-27 PROCEDURE — 80048 BASIC METABOLIC PNL TOTAL CA: CPT | Performed by: INTERNAL MEDICINE

## 2018-02-27 PROCEDURE — 36415 COLL VENOUS BLD VENIPUNCTURE: CPT | Performed by: INTERNAL MEDICINE

## 2018-02-27 NOTE — TELEPHONE ENCOUNTER
Patient informed. He plans on still also coming in tomorrow to see PCP. Will go to ER if weakness symptoms come back before then. Has not had any further issues since we last spoke and SBP at last check was 150.

## 2018-02-27 NOTE — TELEPHONE ENCOUNTER
"Called patient regarding lab result note:     Notes Recorded by Kobe Goodwin MD on 2/27/2018 at 11:50 AM  Please contact patient to inform him of the fact that his repeat basic panel demonstrates that his kidney function tests has worsened likely related to his diuretic.  I would suggest that he stop the diuretic and increase his hydration and I will place an order for a repeat basic metabolic panel in 1 week.  Please also verify that the patient has been scheduled for an appointment with a nephrologist who called me to get more information.     Patient informed. He took HCTZ this AM but will stop now. He has been feeling sick and \"wiped out\" since starting new medications after last appt (citalopram and HCTZ). Cold symptoms and fever intermittently, has resolved now except for still having runny nose. Also reports whole body gets weaker, right hand will drop things. Then said left hand will drop things as well and legs get weak. Weakness resolves quickly. Not happening right now, last happened half hour ago. Feels tired. Symptoms seem to come on when SBP drops to 120. Says he checks BP 10x/day. Sometimes SBP 140s but then will drop to 120 and does not feel good. Reports staff at [facility] told him they want his BP to be 140/90, not 120. Seeing nephrology on Thursday. Also has appointment already scheduled to see PCP tomorrow.    Please advise. Do you want to see him tomorrow regarding above and he also was told to come in for BP check? Or should he be seen earlier? Does have Hx stroke.  "

## 2018-02-27 NOTE — TELEPHONE ENCOUNTER
Advise patient to stop hydrochlorothiazide and citalopram if in fact he is feeling poorly.  He was in regards to right hand and left hand dropping things I would suggest an ER assessment if it persists to rule out recurrent CVA.  It is important that he follows up with nephrology this Thursday to try to maximize his blood pressure control and keep his systolic blood pressure greater than 140 which I agree with.  In Clinic systolic blood pressure has not been well controlled with systolic blood pressures 190 or greater.

## 2018-02-28 ENCOUNTER — OFFICE VISIT (OUTPATIENT)
Dept: INTERNAL MEDICINE | Facility: CLINIC | Age: 63
End: 2018-02-28
Payer: COMMERCIAL

## 2018-02-28 VITALS
DIASTOLIC BLOOD PRESSURE: 86 MMHG | SYSTOLIC BLOOD PRESSURE: 158 MMHG | WEIGHT: 181.2 LBS | BODY MASS INDEX: 27.46 KG/M2 | HEIGHT: 68 IN | HEART RATE: 65 BPM | RESPIRATION RATE: 16 BRPM | OXYGEN SATURATION: 96 % | TEMPERATURE: 98.1 F

## 2018-02-28 DIAGNOSIS — I10 ESSENTIAL HYPERTENSION, BENIGN: ICD-10-CM

## 2018-02-28 PROCEDURE — 99213 OFFICE O/P EST LOW 20 MIN: CPT | Performed by: INTERNAL MEDICINE

## 2018-02-28 RX ORDER — AMLODIPINE BESYLATE 10 MG/1
10 TABLET ORAL DAILY
Qty: 90 TABLET | Refills: 3
Start: 2018-02-28 | End: 2018-07-25

## 2018-02-28 ASSESSMENT — PAIN SCALES - GENERAL: PAINLEVEL: NO PAIN (0)

## 2018-02-28 NOTE — PROGRESS NOTES
SUBJECTIVE:   Adebayo Sierra is a 62 year old male who presents to clinic today for the following health issues:    Patient has appt with Nephrology tomorrow. D/c hctz and citalopram yesterday because of concerns of side effects.     Hypertension Follow-up      Outpatient blood pressures are being checked at home.  Results are 150-120/90-60.    Low Salt Diet: low salt      Amount of exercise or physical activity: None    Problems taking medications regularly: No    Medication side effects: Patient states he felt weak and increased fatigue  With medication changes. Concerned about reaction to citalopram     Diet: regular (no restrictions)      Problem list and histories reviewed & adjusted, as indicated.  Additional history: as documented    Patient Active Problem List   Diagnosis     Counseling regarding advanced directives     Essential hypertension, benign     Hyperlipidemia LDL goal <100     Tobacco abuse     Cerebral infarction due to occlusion of left internal carotid artery (H)     Asymptomatic stenosis of right carotid artery     History reviewed. No pertinent surgical history.    Social History   Substance Use Topics     Smoking status: Former Smoker     Years: 43.00     Types: Cigarettes     Smokeless tobacco: Never Used     Alcohol use Yes      Comment: 3-4 beers daily      History reviewed. No pertinent family history.      Current Outpatient Prescriptions   Medication Sig Dispense Refill     amLODIPine (NORVASC) 5 MG tablet Take 1 tablet (5 mg) by mouth daily 90 tablet 3     metoprolol (LOPRESSOR) 50 MG tablet Take 1 tablet (50 mg) by mouth 2 times daily 180 tablet 1     atorvastatin (LIPITOR) 10 MG tablet TAKE 1 TABLET BY MOUTH EVERY NIGHT AT BEDTIME 90 tablet 3     lisinopril (PRINIVIL/ZESTRIL) 20 MG tablet Take 1 tablet (20 mg) by mouth 2 times daily 180 tablet 3     aspirin 81 MG chewable tablet Take 81 mg by mouth daily       hydrochlorothiazide 12.5 MG TABS tablet Take 1 tablet (12.5 mg) by mouth  "2 times daily 180 tablet 3     citalopram (CELEXA) 20 MG tablet Take 1 tablet (20 mg) by mouth daily 90 tablet 1     Not on File  BP Readings from Last 3 Encounters:   02/28/18 158/86   02/14/18 (!) 204/98   12/27/17 (!) 220/104    Wt Readings from Last 3 Encounters:   02/28/18 181 lb 3.2 oz (82.2 kg)   02/14/18 193 lb (87.5 kg)   01/17/18 186 lb 3.2 oz (84.5 kg)            Reviewed and updated as needed this visit by clinical staff  Tobacco  Allergies  Meds  Med Hx  Surg Hx  Fam Hx  Soc Hx      Reviewed and updated as needed this visit by Provider         ROS:  CONSTITUTIONAL: NEGATIVE for fever, chills, change in weight  INTEGUMENTARY/SKIN: NEGATIVE for worrisome rashes, moles or lesions  ENT/MOUTH: NEGATIVE for ear, mouth and throat problems  RESP: NEGATIVE for significant cough or SOB  CV: NEGATIVE for chest pain, palpitations or peripheral edema  GI: NEGATIVE for nausea, abdominal pain, heartburn, or change in bowel habits  : NEGATIVE for frequency, dysuria, or hematuria  MUSCULOSKELETAL: NEGATIVE for significant arthralgias or myalgia  HEME: NEGATIVE for bleeding problems  PSYCHIATRIC: NEGATIVE for changes in mood or affect    OBJECTIVE:                                                    /86  Pulse 65  Temp 98.1  F (36.7  C) (Oral)  Resp 16  Ht 5' 8\" (1.727 m)  Wt 181 lb 3.2 oz (82.2 kg)  SpO2 96%  BMI 27.55 kg/m2  Body mass index is 27.55 kg/(m^2).  GENERAL:  alert and no distress  EYES: Eyes grossly normal to inspection, extraocular movements - intact, and PERRL  HENT: ear canals- normal; TMs- normal; Nose- normal; Mouth- no ulcers, no lesions  NECK: no tenderness, no adenopathy, no asymmetry, no masses, no stiffness; thyroid- normal to palpation  RESP: lungs clear to auscultation - no rales, no rhonchi, no wheezes  CV: regular rates and rhythm, normal S1 S2, no S3 or S4 and no click or rub   MS: extremities- no gross deformities noted  PSYCH: Alert and oriented times 3; speech- coherent " , normal rate and volume; able to articulate logical thoughts, able to abstract reason, no tangential thoughts, no hallucinations or delusions, affect- normal     ASSESSMENT/PLAN:                                                      (I10) Essential hypertension, benign  Comment: At the present time his blood pressure is somewhat better but I have stopped his hydrochlorothiazide due to a slightly elevated creatinine level.  I have advised him to hydrate himself aggressively and future orders for repeat basic metabolic panel have been placed in the computer for 7-10 days.  His blood pressure still fluctuates quite a bit but I suspect that some of the symptoms that he may have been having in regards to the recent telephone message may be due to the blood pressure dropping too low.  The patient is scheduled to be seen by the nephrologist tomorrow for further evaluation of his blood pressure in the setting of his vascular history.  We will also hold his SSRI and it is unclear whether some of the atypical symptoms he described per the prior telephone messages were related to this and/or his blood pressure systolically being too low.    Plan: amLODIPine (NORVASC) 10 MG tablet        Continue with medications as ordered.      See Patient Instructions    Kobe Goodwin MD  [facility]    THE MEDICATION LIST HAS BEEN FULLY RECONCILED BY THE M.D. AND THE NURSING STAFF.

## 2018-02-28 NOTE — NURSING NOTE
"Chief Complaint   Patient presents with     Hypertension       Initial /86  Pulse 65  Temp 98.1  F (36.7  C) (Oral)  Resp 16  Ht 5' 8\" (1.727 m)  Wt 181 lb 3.2 oz (82.2 kg)  SpO2 96%  BMI 27.55 kg/m2 Estimated body mass index is 27.55 kg/(m^2) as calculated from the following:    Height as of this encounter: 5' 8\" (1.727 m).    Weight as of this encounter: 181 lb 3.2 oz (82.2 kg).  Medication Reconciliation: complete   Enma Gonzalez CMA      "

## 2018-02-28 NOTE — MR AVS SNAPSHOT
After Visit Summary   2/28/2018    Adebayo Sierra    MRN: [id number]           Patient Information     Date Of Birth          1955        Visit Information        Provider Department      2/28/2018 11:20 AM Kobe Goodwin MD [facility]         Follow-ups after your visit        Your next 10 appointments already scheduled     Feb 28, 2018 11:20 AM CST   [facility]    [address] [place] [phone]           Bring a current list of meds and any records pertaining to this visit. For Physicals, please bring immunization records and any forms needing to be filled out. Please arrive 10 minutes early to complete paperwork.            Apr 17, 2018 10:00 AM CDT   Office Visit with [facility] Good[facility]   [facility]    [address] [place] [address] [phone]           Bring a current list of meds and any records pertaining to this visit. For Physicals, please bring immunization records and any forms needing to be filled out. Please arrive 10 minutes early to complete paperwork.              Future tests that were ordered for you today     Open Future Orders        Priority Expected Expires Ordered    Basic metabolic panel Routine 2/27/2018 3/10/2018 2/27/2018            Who to contact     If you have questions or need follow up information about today's clinic visit or your schedule please contact [facility] directly at [phone].  Normal or non-critical lab and imaging results will be communicated to you by MyChart, letter or phone within 4 business days after the clinic has received the results. If you do not hear from us within 7 days, please contact the clinic through MyChart or phone. If you have a critical or abnormal lab result, we  "will notify you by phone as soon as possible.  Submit refill requests through [employer] or call your pharmacy and they will forward the refill request to us. Please allow 3 business days for your refill to be completed.          Additional Information About Your Visit        Paperlinkshart Information     [employer] gives you secure access to your electronic health record. If you see a primary care provider, you can also send messages to your care team and make appointments. If you have questions, please call your primary care clinic.  If you do not have a primary care provider, please call [phone] and they will assist you.        Care EveryWhere ID     This is your Care EveryWhere ID. This could be used by other organizations to access your [place] medical records  [id number]        Your Vitals Were     Pulse Temperature Respirations Height Pulse Oximetry BMI (Body Mass Index)    65 98.1  F (36.7  C) (Oral) 16 5' 8\" (1.727 m) 96% 27.55 kg/m2       Blood Pressure from Last 3 Encounters:   02/28/18 158/86   02/14/18 (!) 204/98   12/27/17 (!) 220/104    Weight from Last 3 Encounters:   02/28/18 181 lb 3.2 oz (82.2 kg)   02/14/18 193 lb (87.5 kg)   01/17/18 186 lb 3.2 oz (84.5 kg)              Today, you had the following     No orders found for display       Primary Care Provider Office Phone # Fax #    Kobe Goodwin [phone]       [address] [place] [phone]        Equal Access to Services     ASTER BUTLER : Hadii orin ku hadasho Soomaali, waaxda luqadaha, qaybta kaalmada adeegyavictor manuel, rochelle perla. So [place] [phone].    ATENCIÓN: Si habla español, tiene a costa disposición servicios gratuitos de asistencia lingüística. Llame al [phone].    We comply with applicable federal civil rights laws and [place] laws. We do not discriminate on the basis of race, color, national origin, age, disability, sex, sexual orientation, or gender identity.            Thank " you!     Thank you for choosing [facility]  for your care. Our goal is always to provide you with excellent care. Hearing back from our patients is one way we can continue to improve our services. Please take a few minutes to complete the written survey that you may receive in the mail after your visit with us. Thank you!             Your Updated Medication List - Protect others around you: Learn how to safely use, store and throw away your medicines at www.disposemymeds.org.          This list is accurate as of 2/28/18 11:17 AM.  Always use your most recent med list.                   Brand Name Dispense Instructions for use Diagnosis    amLODIPine 5 MG tablet    NORVASC    90 tablet    Take 1 tablet (5 mg) by mouth daily    Essential hypertension, benign       aspirin 81 MG chewable tablet      Take 81 mg by mouth daily        atorvastatin 10 MG tablet    LIPITOR    90 tablet    TAKE 1 TABLET BY MOUTH EVERY NIGHT AT BEDTIME    Hyperlipidemia LDL goal <100       lisinopril 20 MG tablet    PRINIVIL/ZESTRIL    180 tablet    Take 1 tablet (20 mg) by mouth 2 times daily    Essential hypertension, benign       metoprolol tartrate 50 MG tablet    LOPRESSOR    180 tablet    Take 1 tablet (50 mg) by mouth 2 times daily    Essential hypertension, benign

## 2018-03-01 ENCOUNTER — TRANSFERRED RECORDS (OUTPATIENT)
Dept: HEALTH INFORMATION MANAGEMENT | Facility: CLINIC | Age: 63
End: 2018-03-01

## 2018-03-01 LAB
CREAT SERPL-MCNC: 1.51 MG/DL (ref 0.7–1.25)
GFR SERPL CREATININE-BSD FRML MDRD: 49 ML/MIN/1.73M2
GLUCOSE SERPL-MCNC: 103 MG/DL (ref 65–99)
POTASSIUM SERPL-SCNC: 5.1 MMOL/L (ref 3.5–5.3)

## 2018-03-08 DIAGNOSIS — I10 ESSENTIAL HYPERTENSION, BENIGN: ICD-10-CM

## 2018-03-08 LAB
ANION GAP SERPL CALCULATED.3IONS-SCNC: 7 MMOL/L (ref 3–14)
BUN SERPL-MCNC: 23 MG/DL (ref 7–30)
CALCIUM SERPL-MCNC: 8.8 MG/DL (ref 8.5–10.1)
CHLORIDE SERPL-SCNC: 105 MMOL/L (ref 94–109)
CO2 SERPL-SCNC: 24 MMOL/L (ref 20–32)
CREAT SERPL-MCNC: 1.58 MG/DL (ref 0.66–1.25)
GFR SERPL CREATININE-BSD FRML MDRD: 45 ML/MIN/1.7M2
GLUCOSE SERPL-MCNC: 109 MG/DL (ref 70–99)
POTASSIUM SERPL-SCNC: 4.1 MMOL/L (ref 3.4–5.3)
SODIUM SERPL-SCNC: 136 MMOL/L (ref 133–144)

## 2018-03-08 PROCEDURE — 36415 COLL VENOUS BLD VENIPUNCTURE: CPT | Performed by: INTERNAL MEDICINE

## 2018-03-08 PROCEDURE — 80048 BASIC METABOLIC PNL TOTAL CA: CPT | Performed by: INTERNAL MEDICINE

## 2018-03-12 ENCOUNTER — HOSPITAL ENCOUNTER (OUTPATIENT)
Dept: ULTRASOUND IMAGING | Facility: CLINIC | Age: 63
Discharge: HOME | End: 2018-03-12
Attending: INTERNAL MEDICINE | Admitting: INTERNAL MEDICINE
Payer: COMMERCIAL

## 2018-03-12 DIAGNOSIS — N18.30 CKD (CHRONIC KIDNEY DISEASE), STAGE III (H): ICD-10-CM

## 2018-03-12 PROCEDURE — 76770 US EXAM ABDO BACK WALL COMP: CPT

## 2018-03-29 ENCOUNTER — TRANSFERRED RECORDS (OUTPATIENT)
Dept: HEALTH INFORMATION MANAGEMENT | Facility: CLINIC | Age: 63
End: 2018-03-29

## 2018-04-02 ENCOUNTER — TRANSFERRED RECORDS (OUTPATIENT)
Dept: HEALTH INFORMATION MANAGEMENT | Facility: CLINIC | Age: 63
End: 2018-04-02

## 2018-04-03 ENCOUNTER — TELEPHONE (OUTPATIENT)
Dept: NURSING | Facility: CLINIC | Age: 63
End: 2018-04-03

## 2018-04-03 NOTE — TELEPHONE ENCOUNTER
"Patient calling to keep Dr. Goodwin \"in the loop\" regarding his BPs and see if he has any further recommendations. Says he was sent to Dr. Stern for kidneys and BP management. Reports that he used to have high BP and now its too low.  a few nights ago and developed stroke-like symptoms- numbness in arm and leg, fatigue, confusion. Symptoms did not last long and resolved. History of stroke when SBP went <120. States his doctors do not want SBP below 140. Gets stroke-like symptoms sometimes when SBP goes <120. Also was told one of his BP meds- carvedilol could cause some syptoms like that. Notes he was at [facility] yesterday for carotid artery check up and that was good. Asked him what his current BP medications are but he was not entirely sure. Says Dr. Stern changed metoprolol to something else-thinks carvedilol , was at 25 mg BID and that made BP too low so he stopped taking it. BP would be at 140 but after taking the carvedilol BP would drop to <120. Carvedilol was then restarted -thinks at 3.125 mg BID- then went to once daily. Was doing good on this but then SBP started to drop to <120. Stopped carvedilol over the weeked on his own advisement, started it back last night because SBP went back to 150 but only took half of a 3.125 mg tablet. Also took 1/2 tab this morning after getting SBP reading of 158. DBPs usually around 70s, pulse usually in 60s. Also on amlodipine- was on 5 mg- now on 10 mg daily (takes two 5 mg tab once daily). Also on lisinopril 20 mg twice a day. -( takes 1/2 40 mg tablet BID). Not having any symptoms right now. Advised to call 911 if stroke like symptoms again but he said specialist yesterday was not too concerned about him needing to go back to hospital.    Did not update med list yet as patient was not positive on dosages/names of medicine (did not have med list with him), and also he was about to call NP for Dr. Stern, so unsure if changes will be made. Asked him to call us back when " has further information so that our med list can be updated to accurately reflect what he is taking.

## 2018-04-12 ENCOUNTER — TELEPHONE (OUTPATIENT)
Dept: INTERNAL MEDICINE | Facility: CLINIC | Age: 63
End: 2018-04-12

## 2018-04-12 NOTE — TELEPHONE ENCOUNTER
If stable at present time and being followed then it is okay with me that the patient has not seen at this time.  Patient does need to realize that if refills are requested in future and the patient has not been seen in the clinic within a reasonable time those refills will likely only be filled if a clinic visit is obtained

## 2018-04-12 NOTE — TELEPHONE ENCOUNTER
Patient calling would prefer not to have to come in to see PCP if not necessary . Pt has been seen and had a work up through [facility] . Pt states kidneys and carotids are fine . Blood pressure in 90's over 50's . Please review the notes in Media and advise us if pt needs to be seen by PCP at this time .

## 2018-04-24 ENCOUNTER — OFFICE VISIT (OUTPATIENT)
Dept: INTERNAL MEDICINE | Facility: CLINIC | Age: 63
End: 2018-04-24
Payer: COMMERCIAL

## 2018-04-24 VITALS
HEIGHT: 68 IN | OXYGEN SATURATION: 98 % | RESPIRATION RATE: 16 BRPM | DIASTOLIC BLOOD PRESSURE: 84 MMHG | BODY MASS INDEX: 28.49 KG/M2 | WEIGHT: 188 LBS | SYSTOLIC BLOOD PRESSURE: 164 MMHG | TEMPERATURE: 97.9 F | HEART RATE: 86 BPM

## 2018-04-24 DIAGNOSIS — I10 ESSENTIAL HYPERTENSION, BENIGN: ICD-10-CM

## 2018-04-24 DIAGNOSIS — I63.232: ICD-10-CM

## 2018-04-24 DIAGNOSIS — F41.9 ANXIETY: ICD-10-CM

## 2018-04-24 DIAGNOSIS — Z23 NEED FOR TDAP VACCINATION: ICD-10-CM

## 2018-04-24 DIAGNOSIS — N18.30 CKD (CHRONIC KIDNEY DISEASE) STAGE 3, GFR 30-59 ML/MIN (H): Primary | ICD-10-CM

## 2018-04-24 LAB
ANION GAP SERPL CALCULATED.3IONS-SCNC: 5 MMOL/L (ref 3–14)
BUN SERPL-MCNC: 19 MG/DL (ref 7–30)
CALCIUM SERPL-MCNC: 9.3 MG/DL (ref 8.5–10.1)
CHLORIDE SERPL-SCNC: 108 MMOL/L (ref 94–109)
CO2 SERPL-SCNC: 27 MMOL/L (ref 20–32)
CREAT SERPL-MCNC: 1.01 MG/DL (ref 0.66–1.25)
GFR SERPL CREATININE-BSD FRML MDRD: 75 ML/MIN/1.7M2
GLUCOSE SERPL-MCNC: 102 MG/DL (ref 70–99)
POTASSIUM SERPL-SCNC: 4.6 MMOL/L (ref 3.4–5.3)
SODIUM SERPL-SCNC: 140 MMOL/L (ref 133–144)

## 2018-04-24 PROCEDURE — 99214 OFFICE O/P EST MOD 30 MIN: CPT | Mod: 25 | Performed by: INTERNAL MEDICINE

## 2018-04-24 PROCEDURE — 36415 COLL VENOUS BLD VENIPUNCTURE: CPT | Performed by: INTERNAL MEDICINE

## 2018-04-24 PROCEDURE — 80048 BASIC METABOLIC PNL TOTAL CA: CPT | Performed by: INTERNAL MEDICINE

## 2018-04-24 PROCEDURE — 90471 IMMUNIZATION ADMIN: CPT | Performed by: INTERNAL MEDICINE

## 2018-04-24 PROCEDURE — 90715 TDAP VACCINE 7 YRS/> IM: CPT | Performed by: INTERNAL MEDICINE

## 2018-04-24 RX ORDER — CITALOPRAM HYDROBROMIDE 20 MG/1
20 TABLET ORAL DAILY
Qty: 90 TABLET | Refills: 3 | Status: SHIPPED | OUTPATIENT
Start: 2018-04-24 | End: 2019-07-15

## 2018-04-24 RX ORDER — CITALOPRAM HYDROBROMIDE 20 MG/1
TABLET ORAL
Refills: 1 | COMMUNITY
Start: 2018-02-14 | End: 2018-07-25

## 2018-04-24 NOTE — MR AVS SNAPSHOT
After Visit Summary   4/24/2018    Adebayo Sierra    MRN: [id number]           Patient Information     Date Of Birth          1955        Visit Information        Provider Department      4/24/2018 8:40 AM Kobe Goodwin MD [facility]        Today's Diagnoses     CKD (chronic kidney disease) stage 3, GFR 30-59 ml/min    -  1    Cerebral infarction due to occlusion of left internal carotid artery (H)        Essential hypertension, benign        Anxiety        Need for Tdap vaccination           Follow-ups after your visit        Follow-up notes from your care team     Return in about 3 months (around 7/24/2018), or if symptoms worsen or fail to improve, for Lab Work, BP Recheck.      Who to contact     If you have questions or need follow up information about today's clinic visit or your schedule please contact [facility] directly at [phone].  Normal or non-critical lab and imaging results will be communicated to you by MyChart, letter or phone within 4 business days after the clinic has received the results. If you do not hear from us within 7 days, please contact the clinic through "[employer]hart or phone. If you have a critical or abnormal lab result, we will notify you by phone as soon as possible.  Submit refill requests through [employer] or call your pharmacy and they will forward the refill request to us. Please allow 3 business days for your refill to be completed.          Additional Information About Your Visit        MyChart Information     [employer]That gives you secure access to your electronic health record. If you see a primary care provider, you can also send messages to your care team and make appointments. If you have questions, please call your primary care clinic.  If you do not have a primary care provider, please call [phone] and they will assist you.        Care EveryWhere ID     This is your Care EveryWhere ID. This could be  "used by other organizations to access your [place] medical records  [id number]        Your Vitals Were     Pulse Temperature Respirations Height Pulse Oximetry BMI (Body Mass Index)    86 97.9  F (36.6  C) (Oral) 16 5' 8\" (1.727 m) 98% 28.59 kg/m2       Blood Pressure from Last 3 Encounters:   04/24/18 164/84   02/28/18 158/86   02/14/18 (!) 204/98    Weight from Last 3 Encounters:   04/24/18 188 lb (85.3 kg)   02/28/18 181 lb 3.2 oz (82.2 kg)   02/14/18 193 lb (87.5 kg)              We Performed the Following     ADMIN 1st VACCINE     Basic metabolic panel     TDAP VACCINE (ADACEL)          Today's Medication Changes          These changes are accurate as of 4/24/18  9:11 AM.  If you have any questions, ask your nurse or doctor.               These medicines have changed or have updated prescriptions.        Dose/Directions    * citalopram 20 MG tablet   Commonly known as:  celeXA   This may have changed:  Another medication with the same name was added. Make sure you understand how and when to take each.   Changed by:  Kobe Goodwin MD        Refills:  1       * citalopram 20 MG tablet   Commonly known as:  celeXA   This may have changed:  You were already taking a medication with the same name, and this prescription was added. Make sure you understand how and when to take each.   Used for:  Anxiety   Changed by:  Kobe Goodwin MD        Dose:  20 mg   Take 1 tablet (20 mg) by mouth daily   Quantity:  90 tablet   Refills:  3       * Notice:  This list has 2 medication(s) that are the same as other medications prescribed for you. Read the directions carefully, and ask your doctor or other care provider to review them with you.         Where to get your medicines      [facility] [address] [facility] [address] [facility] [address] [facility] [address]     Phone:  [phone]     citalopram 20 MG tablet                " Primary Care Provider Office Phone # Fax #    Kobe Goodwin [phone]       [address] [facility] [phone]        Equal Access to Services     DOC BUTLER : Hadii aad ku hadgabrielleo Soomaali, waaxda luqadaha, qaybta kaalmada adeegyada, rochelle luzjosh perla. So [facility] [phone].    ATENCIÓN: Si habla español, tiene a costa disposición servicios gratuitos de asistencia lingüística. Llame al [phone].    We comply with applicable federal civil rights laws and [place] laws. We do not discriminate on the basis of race, color, national origin, age, disability, sex, sexual orientation, or gender identity.            Thank you!     Thank you for choosing [facility]  for your care. Our goal is always to provide you with excellent care. Hearing back from our patients is one way we can continue to improve our services. Please take a few minutes to complete the written survey that you may receive in the mail after your visit with us. Thank you!             Your Updated Medication List - Protect others around you: Learn how to safely use, store and throw away your medicines at www.disposemymeds.org.          This list is accurate as of 4/24/18  9:11 AM.  Always use your most recent med list.                   Brand Name Dispense Instructions for use Diagnosis    amLODIPine 10 MG tablet    NORVASC    90 tablet    Take 1 tablet (10 mg) by mouth daily    Essential hypertension, benign       aspirin 81 MG chewable tablet      Take 81 mg by mouth daily        atorvastatin 10 MG tablet    LIPITOR    90 tablet    TAKE 1 TABLET BY MOUTH EVERY NIGHT AT BEDTIME    Hyperlipidemia LDL goal <100       * citalopram 20 MG tablet    celeXA          * citalopram 20 MG tablet    celeXA    90 tablet    Take 1 tablet (20 mg) by mouth daily    Anxiety       lisinopril 20 MG tablet    PRINIVIL/ZESTRIL    180 tablet    Take 1 tablet (20 mg) by mouth 2 times daily     Essential hypertension, benign       * Notice:  This list has 2 medication(s) that are the same as other medications prescribed for you. Read the directions carefully, and ask your doctor or other care provider to review them with you.

## 2018-04-24 NOTE — LETTER
[facility]  [address] [place] [address]  [phone]      4/24/2018       Adebayo Sierra  [address] [facility] [address]        Dear Adebayo,    Good news.  Your basic panel and kidney function tests are now normal.  I would recheck this in 3 months as we discussed for reassurance.    Sincerely,      Kobe Goodwin MD  Internal Medicine

## 2018-04-24 NOTE — PROGRESS NOTES
SUBJECTIVE:   Adebayo Sierra is a 62 year old male who presents to clinic today for the following health issues:    Hypertension Follow-up       Outpatient blood pressures are being checked at home.  Results are 150-140/80-70.    Low Salt Diet: low salt    Patient had been seeing Intermed Consultant for BP but states readings were too low on medication regimen with using Carvedilol. Patient D/C carvedilol and increased Norvasc back to 5 mg.        Amount of exercise or physical activity: None    Problems taking medications regularly: No    Medication side effects: none    Diet: regular (no restrictions)      Problem list and histories reviewed & adjusted, as indicated.  Additional history: as documented    Patient Active Problem List   Diagnosis     Counseling regarding advanced directives     Essential hypertension, benign     Hyperlipidemia LDL goal <100     Tobacco abuse     Cerebral infarction due to occlusion of left internal carotid artery (H)     Asymptomatic stenosis of right carotid artery     History reviewed. No pertinent surgical history.    Social History   Substance Use Topics     Smoking status: Former Smoker     Years: 43.00     Types: Cigarettes     Smokeless tobacco: Never Used     Alcohol use Yes      Comment: 3-4 beers daily      History reviewed. No pertinent family history.      Current Outpatient Prescriptions   Medication Sig Dispense Refill     amLODIPine (NORVASC) 10 MG tablet Take 1 tablet (10 mg) by mouth daily 90 tablet 3     aspirin 81 MG chewable tablet Take 81 mg by mouth daily       atorvastatin (LIPITOR) 10 MG tablet TAKE 1 TABLET BY MOUTH EVERY NIGHT AT BEDTIME 90 tablet 3     lisinopril (PRINIVIL/ZESTRIL) 20 MG tablet Take 1 tablet (20 mg) by mouth 2 times daily 180 tablet 3     metoprolol (LOPRESSOR) 50 MG tablet Take 1 tablet (50 mg) by mouth 2 times daily 180 tablet 1     Not on File  BP Readings from Last 3 Encounters:   02/28/18 158/86   02/14/18 (!) 204/98   12/27/17 (!)  "220/104    Wt Readings from Last 3 Encounters:   02/28/18 181 lb 3.2 oz (82.2 kg)   02/14/18 193 lb (87.5 kg)   01/17/18 186 lb 3.2 oz (84.5 kg)                  Labs reviewed in EPIC    Reviewed and updated as needed this visit by clinical staff  Tobacco  Allergies  Med Hx  Surg Hx  Fam Hx  Soc Hx      Reviewed and updated as needed this visit by Provider         ROS:  CONSTITUTIONAL: NEGATIVE for fever, chills, change in weight  ENT/MOUTH: NEGATIVE for ear, mouth and throat problems  RESP: NEGATIVE for significant cough or SOB  CV: NEGATIVE for chest pain, palpitations or peripheral edema  GI: NEGATIVE for nausea, abdominal pain, heartburn, or change in bowel habits  : NEGATIVE for frequency, dysuria, or hematuria  MUSCULOSKELETAL: NEGATIVE for significant arthralgias or myalgia  HEME: NEGATIVE for bleeding problems  PSYCHIATRIC: NEGATIVE for changes in mood or affect    OBJECTIVE:                                                    /84  Pulse 86  Temp 97.9  F (36.6  C) (Oral)  Resp 16  Ht 5' 8\" (1.727 m)  Wt 188 lb (85.3 kg)  SpO2 98%  BMI 28.59 kg/m2  Body mass index is 28.59 kg/(m^2).  GENERAL: healthy, alert and no distress  EYES: Eyes grossly normal to inspection, extraocular movements - intact, and PERRL  HENT: ear canals- normal; TMs- normal; Nose- normal; Mouth- no ulcers, no lesions  NECK: no tenderness, no adenopathy, no asymmetry, no masses, no stiffness; thyroid- normal to palpation  RESP: lungs clear to auscultation - no rales, no rhonchi, no wheezes  CV: regular rates and rhythm, normal S1 S2, no S3 or S4 and no click or rub   MS: extremities- no gross deformities noted  PSYCH: Alert and oriented times 3; speech- coherent , normal rate and volume; able to articulate logical thoughts, able to abstract reason, no tangential thoughts, no hallucinations or delusions, affect- normal     ASSESSMENT/PLAN:                                                      (N18.3) CKD (chronic kidney " disease) stage 3, GFR 30-59 ml/min  (primary encounter diagnosis)  Comment: Currently stable on medical therapy as directed.  Patient has seen nephrology with noted recommendations and a normal renal ultrasound to rule out secondary causes of hypertension per  Plan: Basic metabolic panel, assuming stable suggest blood pressure and BMP check in 3 months.  Encouraged aggressive hydration.        Patient has stopped the carbon dye all on his own due to hypotension and is now on the noted above combination of amlodipine and lisinopril.    (I63.232) Cerebral infarction due to occlusion of left internal carotid artery (H)  Comment: Stable with no residual defects as noted  Plan: Probably better to run slightly higher blood pressure than lower based on this patient's and added    (I10) Essential hypertension, benign  Comment: Continue with amlodipine and lisinopril in combination.  Plan: Basic metabolic panel        Your overall blood pressures show average systolic blood pressure in the 140s diastolic blood pressures in the upper 70s to low 80s with the exception of today's blood pressure.  I will not change her at any medications due to concern for recurrent CVA and hypotension    (F41.9) Anxiety  Comment: Stable on SSRI continue with medications as ordered see JOHNNY score 0  Plan: citalopram (CELEXA) 20 MG tablet            (Z23) Need for Tdap vaccination  Comment: Updated per patient  Plan: TDAP VACCINE (ADACEL), ADMIN 1st VACCINE          See Patient Instructions    Kobe Goodwin MD  [facility]    THE MEDICATION LIST HAS BEEN FULLY RECONCILED BY THE M.D. AND THE NURSING STAFF.    25 minutes spent with this patient, face to face, discussing treatment options for listed problems above as well as side effects of appropriate medications.  Counseling time extended beyond 50% of the clinic visit.  Medication dosing, treatment plan and follow-up were discussed. Also reviewed need for primary care  testing for patient.

## 2018-06-19 DIAGNOSIS — I10 ESSENTIAL HYPERTENSION, BENIGN: ICD-10-CM

## 2018-06-19 NOTE — TELEPHONE ENCOUNTER
"Requested Prescriptions   Pending Prescriptions Disp Refills     amLODIPine (NORVASC) 10 MG tablet  Last Written Prescription Date:  2/28/18  Last Fill Quantity: 90,  # refills: 3   Last office visit: 4/24/2018   Future Office Visit:   Next 5 appointments (look out 90 days)     Jul 18, 2018 11:40 AM CDT   Office Visit with Kobe Goodwin MD   [facility]    [address] [address] [phone]                  90 tablet 3     Sig: Take 1 tablet (10 mg) by mouth daily    Calcium Channel Blockers Protocol  Failed    6/19/2018 10:38 AM       Failed - Blood pressure under 140/90 in past 12 months    BP Readings from Last 3 Encounters:   04/24/18 164/84   02/28/18 158/86   02/14/18 (!) 204/98                Passed - Recent (12 mo) or future (30 days) visit within the authorizing provider's specialty    Patient had office visit in the last 12 months or has a visit in the next 30 days with authorizing provider or within the authorizing provider's specialty.  See \"Patient Info\" tab in inbasket, or \"Choose Columns\" in Meds & Orders section of the refill encounter.           Passed - Patient is age 18 or older       Passed - Normal serum creatinine on file in past 12 months    Recent Labs   Lab Test  04/24/18   0914   CR  1.01               "

## 2018-06-20 RX ORDER — AMLODIPINE BESYLATE 10 MG/1
10 TABLET ORAL DAILY
Qty: 90 TABLET | Refills: 3 | OUTPATIENT
Start: 2018-06-20

## 2018-07-18 ENCOUNTER — TELEPHONE (OUTPATIENT)
Dept: INTERNAL MEDICINE | Facility: CLINIC | Age: 63
End: 2018-07-18

## 2018-07-18 NOTE — LETTER
[facility]  [address] [address]  [phone]  July 18, 2018    Adebayo Sierra  [address] [facility] [address]    Dear Adebayo,    We care about your health and based on a review of your medical records, recommend the the following, to better manage your health:      You are in particular need of attention regarding:  -Colon Cancer Screening    I am recommending that you:     -schedule a COLONOSCOPY to look for colon cancer (due every 10 years or 5 years in higher risk situations.)        Colon cancer is now the second leading cause of cancer-related deaths in the [place] for both men and women and there are over 130,000 new cases and 50,000 deaths per year from colon cancer.  Colonoscopies can prevent 90-95% of these deaths.  Problem lesions can be removed before they ever become cancer.  This test is not only looking for cancer, but also getting rid of precancerious lesions.    If you are under/uninsured, we recommend you contact the [employer] program. [employer] is a free colorectal cancer screening program that provides colonoscopies for eligible under/uninsured [place] men and women. If you are interested in receiving a free colonoscopy, please call [employer] at [phone] (mention code Sc[employer]Web) to see if you re eligible.      If you do not wish to do a colonoscopy or cannot afford to do one, at this time, there is another option. It is called a FIT test or Fecal Immunochemical Occult Blood Test (take home stool sample kit).  It does not replace the colonoscopy for colorectal cancer screening, but it can detect hidden bleeding in the lower colon.  It does need to be repeated every year and if a positive result is obtained, you would be referred for a colonoscopy.          If you have completed either one of these tests at another facility, please call with the details of when and where the tests were done and if  they were normal or not. Or have the records sent to our clinic so that we can best coordinate your care.      Here is a list of Health Maintenance topics that are due now or due soon:  Health Maintenance Due   Topic Date Due           Hepatitis C Screening  11/13/1973     Colon Cancer Screening - every 10 years.  11/13/2005       Please call us at [phone] or [phone] (or use [employer] App) to address the above recommendations.     Thank you for trusting [facility].  We appreciate the opportunity to serve you and look forward to supporting your healthcare needs in the future.    If you have (or plan to have) any of these tests done at a facility other than a [facility] or a [facility], please have the results from these tests sent to your primary physician at [facility].    Healthy Regards,    Your Care Team

## 2018-07-18 NOTE — TELEPHONE ENCOUNTER
Panel Management Review    Patient Active Problem List   Diagnosis     Counseling regarding advanced directives     Essential hypertension, benign     Hyperlipidemia LDL goal <100     Tobacco abuse     Cerebral infarction due to occlusion of left internal carotid artery (H)     Asymptomatic stenosis of right carotid artery       Patient has the following on his problem list:     Hypertension   Last three blood pressure readings:  BP Readings from Last 3 Encounters:   04/24/18 164/84   02/28/18 158/86   02/14/18 (!) 204/98     Blood pressure: FAILED    HTN Guidelines:  Age 18-59 BP range:  Less than 140/90  Age 60-85 with Diabetes:  Less than 140/90  Age 60-85 without Diabetes:  less than 150/90      Composite cancer screening  Chart review shows that this patient is due/due soon for the following Colonoscopy  Summary:    Patient is due/failing the following:   COLONOSCOPY    Action needed:   Patient needs referral/order: Colonoscopy    Type of outreach:    Sent letter.    Questions for provider review:    None                                                                                                                                    Enma Gonzalez CMA       Chart routed to Care Team .

## 2018-07-25 ENCOUNTER — OFFICE VISIT (OUTPATIENT)
Dept: INTERNAL MEDICINE | Facility: CLINIC | Age: 63
End: 2018-07-25
Payer: COMMERCIAL

## 2018-07-25 VITALS
SYSTOLIC BLOOD PRESSURE: 128 MMHG | WEIGHT: 192.2 LBS | TEMPERATURE: 98 F | DIASTOLIC BLOOD PRESSURE: 82 MMHG | RESPIRATION RATE: 14 BRPM | OXYGEN SATURATION: 97 % | HEIGHT: 68 IN | BODY MASS INDEX: 29.13 KG/M2 | HEART RATE: 78 BPM

## 2018-07-25 DIAGNOSIS — I63.232: ICD-10-CM

## 2018-07-25 DIAGNOSIS — E78.5 HYPERLIPIDEMIA LDL GOAL <100: ICD-10-CM

## 2018-07-25 DIAGNOSIS — Z72.0 TOBACCO ABUSE: ICD-10-CM

## 2018-07-25 DIAGNOSIS — I10 ESSENTIAL HYPERTENSION, BENIGN: Primary | ICD-10-CM

## 2018-07-25 DIAGNOSIS — Z12.11 SCREEN FOR COLON CANCER: ICD-10-CM

## 2018-07-25 DIAGNOSIS — Z11.4 SCREENING FOR HIV (HUMAN IMMUNODEFICIENCY VIRUS): ICD-10-CM

## 2018-07-25 DIAGNOSIS — Z11.59 NEED FOR HEPATITIS C SCREENING TEST: ICD-10-CM

## 2018-07-25 PROCEDURE — 99214 OFFICE O/P EST MOD 30 MIN: CPT | Performed by: INTERNAL MEDICINE

## 2018-07-25 RX ORDER — LISINOPRIL 10 MG/1
10 TABLET ORAL DAILY
Qty: 90 TABLET | Refills: 3 | Status: SHIPPED | OUTPATIENT
Start: 2018-07-25 | End: 2019-07-13

## 2018-07-25 NOTE — MR AVS SNAPSHOT
After Visit Summary   7/25/2018    Adebayo Sierra    MRN: [id number]           Patient Information     Date Of Birth          1955        Visit Information        Provider Department      7/25/2018 10:20 AM Kobe Goodwin MD [facility]        Today's Diagnoses     Hyperlipidemia LDL goal <100    -  1    Essential hypertension, benign        Screen for colon cancer        Need for hepatitis C screening test        Screening for HIV (human immunodeficiency virus)        Tobacco abuse           Follow-ups after your visit        Additional Services     GASTROENTEROLOGY ADULT REF PROCEDURE ONLY [facility] [phone]; No Provider Preference       Last Lab Result: Creatinine (mg/dL)       Date                     Value                 04/24/2018               1.01             ----------  Body mass index is 29.22 kg/(m^2).     Needed:  No  Language:  English    Patient will be contacted to schedule procedure.     Please be aware that coverage of these services is subject to the terms and limitations of your health insurance plan.  Call member services at your health plan with any benefit or coverage questions.  Any procedures must be performed at a [place] facility OR coordinated by your clinic's referral office.    Please bring the following with you to your appointment:    (1) Any X-Rays, CTs or MRIs which have been performed.  Contact the facility where they were done to arrange for  prior to your scheduled appointment.    (2) List of current medications   (3) This referral request   (4) Any documents/labs given to you for this referral                  Future tests that were ordered for you today     Open Future Orders        Priority Expected Expires Ordered    Hepatitis C Screen Reflex to HCV RNA Quant and Genotype Routine 9/1/2018 9/30/2018 7/25/2018    HIV Screening Routine 9/1/2018 9/30/2018 7/25/2018    Fecal colorectal cancer screen  "(FIT) Routine 8/15/2018 10/17/2018 7/25/2018    GASTROENTEROLOGY ADULT REF PROCEDURE ONLY N[facility] He[facility] [phone]; No Provider Preference Routine 9/1/2018 9/30/2018 7/25/2018    Comprehensive metabolic panel Routine 9/1/2018 9/30/2018 7/25/2018    Lipid Profile Routine 9/1/2018 9/30/2018 7/25/2018            Who to contact     If you have questions or need follow up information about today's clinic visit or your schedule please contact [facility] directly at [phone].  Normal or non-critical lab and imaging results will be communicated to you by finalsitehart, letter or phone within 4 business days after the clinic has received the results. If you do not hear from us within 7 days, please contact the clinic through finalsitehart or phone. If you have a critical or abnormal lab result, we will notify you by phone as soon as possible.  Submit refill requests through [employer] or call your pharmacy and they will forward the refill request to us. Please allow 3 business days for your refill to be completed.          Additional Information About Your Visit        finalsiteharAr[employer] Information     [employer]ert[employer]Card gives you secure access to your electronic health record. If you see a primary care provider, you can also send messages to your care team and make appointments. If you have questions, please call your primary care clinic.  If you do not have a primary care provider, please call [phone] and they will assist you.        Care EveryWhere ID     This is your Care EveryWhere ID. This could be used by other organizations to access your [facility] medical records  [id number]        Your Vitals Were     Pulse Temperature Respirations Height Pulse Oximetry BMI (Body Mass Index)    78 98  F (36.7  C) (Oral) 14 5' 8\" (1.727 m) 97% 29.22 kg/m2       Blood Pressure from Last 3 Encounters:   07/25/18 128/82   04/24/18 164/84   02/28/18 158/86    Weight from Last 3 Encounters:   07/25/18 192 lb 3.2 oz (87.2 kg) "   04/24/18 188 lb (85.3 kg)   02/28/18 181 lb 3.2 oz (82.2 kg)                 Today's Medication Changes          These changes are accurate as of 7/25/18 10:37 AM.  If you have any questions, ask your nurse or doctor.               These medicines have changed or have updated prescriptions.        Dose/Directions    lisinopril 10 MG tablet   Commonly known as:  PRINIVIL/ZESTRIL   This may have changed:    - medication strength  - how much to take  - when to take this   Used for:  Essential hypertension, benign   Changed by:  Kobe Goodwin MD        Dose:  10 mg   Take 1 tablet (10 mg) by mouth daily   Quantity:  90 tablet   Refills:  3            Where to get your medicines      [facility] [address] [place] [address] AT [facility] & [address] [place] [address]     Phone:  [phone]     lisinopril 10 MG tablet                Primary Care Provider Office Phone # Fax #    Kobe Goodwin [phone]       [address] [place] [phone]        Equal Access to Services     ASTER BUTLER : Hadii aad ku hadasho Soomaali, waaxda luqadaha, qaybta kaalmada adeegyada, waxay idiin hayaan adeeg kharamelani laelvia ah. So [facility] [phone].    ATENCIÓN: Si habla español, tiene a costa disposición servicios gratuitos de asistencia lingüística. [facility] [phone].    We comply with applicable federal civil rights laws and [place] laws. We do not discriminate on the basis of race, color, national origin, age, disability, sex, sexual orientation, or gender identity.            Thank you!     Thank you for choosing [facility]  for your care. Our goal is always to provide you with excellent care. Hearing back from our patients is one way we can continue to improve our services. Please take a few minutes to complete the written survey that you may receive in the mail after your visit with us.  Thank you!             Your Updated Medication List - Protect others around you: Learn how to safely use, store and throw away your medicines at www.disposemymeds.org.          This list is accurate as of 7/25/18 10:37 AM.  Always use your most recent med list.                   Brand Name Dispense Instructions for use Diagnosis    aspirin 81 MG chewable tablet      Take 81 mg by mouth daily        atorvastatin 10 MG tablet    LIPITOR    90 tablet    TAKE 1 TABLET BY MOUTH EVERY NIGHT AT BEDTIME    Hyperlipidemia LDL goal <100       citalopram 20 MG tablet    celeXA    90 tablet    Take 1 tablet (20 mg) by mouth daily    Anxiety       lisinopril 10 MG tablet    PRINIVIL/ZESTRIL    90 tablet    Take 1 tablet (10 mg) by mouth daily    Essential hypertension, benign

## 2018-07-25 NOTE — PROGRESS NOTES
SUBJECTIVE:   Adebayo Sierra is a 62 year old male who presents to clinic today for the following health issues:    Recheck kidney function from 4/24/18    Hypertension Follow-up        Outpatient blood pressures are being checked at home.  Results are 120-110-/70.    Low Salt Diet: low salt    Patient states BP readings were dropping too low so he decreased his BP medications to only lisinopril 10 mg daily       Amount of exercise or physical activity: None    Problems taking medications regularly: No    Medication side effects: none    Diet: regular (no restrictions)    Problem list and histories reviewed & adjusted, as indicated.  Additional history: as documented    Patient Active Problem List   Diagnosis     Counseling regarding advanced directives     Essential hypertension, benign     Hyperlipidemia LDL goal <100     Tobacco abuse     Cerebral infarction due to occlusion of left internal carotid artery (H)     Asymptomatic stenosis of right carotid artery     History reviewed. No pertinent surgical history.    Social History   Substance Use Topics     Smoking status: Former Smoker     Years: 43.00     Types: Cigarettes     Smokeless tobacco: Never Used     Alcohol use Yes      Comment: 3-4 beers daily      History reviewed. No pertinent family history.      Current Outpatient Prescriptions   Medication Sig Dispense Refill     aspirin 81 MG chewable tablet Take 81 mg by mouth daily       atorvastatin (LIPITOR) 10 MG tablet TAKE 1 TABLET BY MOUTH EVERY NIGHT AT BEDTIME 90 tablet 3     citalopram (CELEXA) 20 MG tablet Take 1 tablet (20 mg) by mouth daily 90 tablet 3     lisinopril (PRINIVIL/ZESTRIL) 20 MG tablet Take 1 tablet (20 mg) by mouth 2 times daily (Patient taking differently: Take 10 mg by mouth daily ) 180 tablet 3     amLODIPine (NORVASC) 10 MG tablet Take 1 tablet (10 mg) by mouth daily (Patient not taking: Reported on 7/25/2018) 90 tablet 3     [DISCONTINUED] citalopram (CELEXA) 20 MG tablet   1  "    Not on File  BP Readings from Last 3 Encounters:   07/25/18 158/82   04/24/18 164/84   02/28/18 158/86    Wt Readings from Last 3 Encounters:   07/25/18 192 lb 3.2 oz (87.2 kg)   04/24/18 188 lb (85.3 kg)   02/28/18 181 lb 3.2 oz (82.2 kg)                    Reviewed and updated as needed this visit by clinical staff  Tobacco  Allergies  Meds  Med Hx  Surg Hx  Fam Hx  Soc Hx      Reviewed and updated as needed this visit by Provider         ROS:  CONSTITUTIONAL: NEGATIVE for fever, chills, change in weight  EYES: NEGATIVE for vision changes or irritation  ENT/MOUTH: NEGATIVE for ear, mouth and throat problems  RESP: NEGATIVE for significant cough or SOB  CV: NEGATIVE for chest pain, palpitations or peripheral edema  GI: NEGATIVE for nausea, abdominal pain, heartburn, or change in bowel habits  : NEGATIVE for frequency, dysuria, or hematuria  MUSCULOSKELETAL: NEGATIVE for significant arthralgias or myalgia  NEURO: NEGATIVE for weakness, dizziness or paresthesias  ENDOCRINE: NEGATIVE for temperature intolerance, skin/hair changes  HEME: NEGATIVE for bleeding problems  PSYCHIATRIC: NEGATIVE for changes in mood or affect    OBJECTIVE:                                                    /82  Pulse 78  Temp 98  F (36.7  C) (Oral)  Resp 14  Ht 5' 8\" (1.727 m)  Wt 192 lb 3.2 oz (87.2 kg)  SpO2 97%  BMI 29.22 kg/m2  Body mass index is 29.22 kg/(m^2).  GENERAL: healthy, alert and no distress  EYES: Eyes grossly normal to inspection, extraocular movements - intact, and PERRL  HENT: ear canals- normal; TMs- normal; Nose- normal; Mouth- no ulcers, no lesions  NECK: no tenderness, no adenopathy, no asymmetry, no masses, no stiffness; thyroid- normal to palpation  RESP: lungs clear to auscultation - no rales, no rhonchi, no wheezes  CV: regular rates and rhythm, normal S1 S2, no S3 or S4 and no murmur, no click or rub -  MS: extremities- no gross deformities noted, no edema  PSYCH: Alert and oriented times " 3; speech- coherent , normal rate and volume; able to articulate logical thoughts, able to abstract reason, no tangential thoughts, no hallucinations or delusions, affect- normal       ASSESSMENT/PLAN:                                                      (I10) Essential hypertension, benign  (primary encounter diagnosis)  Comment: Stable with blood pressure at current goal continue with medication as ordered  Plan: lisinopril (PRINIVIL/ZESTRIL) 10 MG tablet,         Comprehensive metabolic panel        Suggest repeat blood pressure in 6 month    (I63.232) Cerebral infarction due to occlusion of left internal carotid artery (H)  Comment: Stable with no residual impact  Plan: Continue with aspirin daily and suggested and advised potential benefit of pneumococcal vaccination in the form of Prevnar early    (E78.5) Hyperlipidemia LDL goal <100  Comment: Stable goal LDL noted labs ordered as routine  Plan: Comprehensive metabolic panel, Lipid Profile          LDL Cholesterol Calculated   Date Value Ref Range Status   09/13/2017 71 <100 mg/dL Final     Comment:     Desirable:       <100 mg/dl       (Z12.11) Screen for colon cancer  Comment: ADVISED COLONOSCOPY FOR ROUTINE PREVENTATIVE CARE.  Plan: Fecal colorectal cancer screen (FIT),         GASTROENTEROLOGY ADULT REF PROCEDURE ONLY         Neyda Healy [phone]; No Provider        Preference            (Z11.59) Need for hepatitis C screening test  Comment: Routine screening tests ordered next lab  Plan: Hepatitis C Screen Reflex to HCV RNA Quant and         Genotype            (Z11.4) Screening for HIV (human immunodeficiency virus)  Comment: Routine screening tests ordered next lab  Plan: HIV Screening            (Z72.0) Tobacco abuse  Comment: Advised continued and ongoing smoking cessation  Plan: Patient was congratulated      See Patient Instructions discussed also shingles vaccination    Kobe Goodwin MD  [place] [id number]  minutes spent with this patient, face to face, discussing treatment options for listed problems above as well as side effects of appropriate medications.  Counseling time extended beyond 50% of the clinic visit.  Medication dosing, treatment plan and follow-up were discussed. Also reviewed need for primary care testing for patient.

## 2018-09-17 DIAGNOSIS — Z11.59 NEED FOR HEPATITIS C SCREENING TEST: ICD-10-CM

## 2018-09-17 DIAGNOSIS — I10 ESSENTIAL HYPERTENSION, BENIGN: ICD-10-CM

## 2018-09-17 DIAGNOSIS — Z11.4 SCREENING FOR HIV (HUMAN IMMUNODEFICIENCY VIRUS): ICD-10-CM

## 2018-09-17 DIAGNOSIS — E78.5 HYPERLIPIDEMIA LDL GOAL <100: ICD-10-CM

## 2018-09-17 LAB
ALBUMIN SERPL-MCNC: 3.6 G/DL (ref 3.4–5)
ALP SERPL-CCNC: 82 U/L (ref 40–150)
ALT SERPL W P-5'-P-CCNC: 25 U/L (ref 0–70)
ANION GAP SERPL CALCULATED.3IONS-SCNC: 5 MMOL/L (ref 3–14)
AST SERPL W P-5'-P-CCNC: 14 U/L (ref 0–45)
BILIRUB SERPL-MCNC: 0.9 MG/DL (ref 0.2–1.3)
BUN SERPL-MCNC: 14 MG/DL (ref 7–30)
CALCIUM SERPL-MCNC: 8.7 MG/DL (ref 8.5–10.1)
CHLORIDE SERPL-SCNC: 105 MMOL/L (ref 94–109)
CHOLEST SERPL-MCNC: 179 MG/DL
CO2 SERPL-SCNC: 28 MMOL/L (ref 20–32)
CREAT SERPL-MCNC: 1.24 MG/DL (ref 0.66–1.25)
GFR SERPL CREATININE-BSD FRML MDRD: 59 ML/MIN/1.7M2
GLUCOSE SERPL-MCNC: 104 MG/DL (ref 70–99)
HDLC SERPL-MCNC: 61 MG/DL
LDLC SERPL CALC-MCNC: 104 MG/DL
NONHDLC SERPL-MCNC: 118 MG/DL
POTASSIUM SERPL-SCNC: 4.4 MMOL/L (ref 3.4–5.3)
PROT SERPL-MCNC: 7.5 G/DL (ref 6.8–8.8)
SODIUM SERPL-SCNC: 138 MMOL/L (ref 133–144)
TRIGL SERPL-MCNC: 72 MG/DL

## 2018-09-17 PROCEDURE — 87389 HIV-1 AG W/HIV-1&-2 AB AG IA: CPT | Performed by: INTERNAL MEDICINE

## 2018-09-17 PROCEDURE — 36415 COLL VENOUS BLD VENIPUNCTURE: CPT | Performed by: INTERNAL MEDICINE

## 2018-09-17 PROCEDURE — 80053 COMPREHEN METABOLIC PANEL: CPT | Performed by: INTERNAL MEDICINE

## 2018-09-17 PROCEDURE — 86803 HEPATITIS C AB TEST: CPT | Performed by: INTERNAL MEDICINE

## 2018-09-17 PROCEDURE — 80061 LIPID PANEL: CPT | Performed by: INTERNAL MEDICINE

## 2018-09-18 LAB
HCV AB SERPL QL IA: NONREACTIVE
HIV 1+2 AB+HIV1 P24 AG SERPL QL IA: NONREACTIVE

## 2018-10-17 DIAGNOSIS — I10 ESSENTIAL HYPERTENSION, BENIGN: ICD-10-CM

## 2018-10-17 DIAGNOSIS — E78.5 HYPERLIPIDEMIA LDL GOAL <100: ICD-10-CM

## 2018-10-18 RX ORDER — ATORVASTATIN CALCIUM 10 MG/1
TABLET, FILM COATED ORAL
Qty: 90 TABLET | Refills: 2 | Status: SHIPPED | OUTPATIENT
Start: 2018-10-18 | End: 2019-07-13

## 2018-10-18 RX ORDER — AMLODIPINE BESYLATE 5 MG/1
TABLET ORAL
Qty: 90 TABLET | Refills: 2 | Status: SHIPPED | OUTPATIENT
Start: 2018-10-18

## 2018-10-18 NOTE — TELEPHONE ENCOUNTER
"Last Written Prescription Date:  2/28/18  Last Fill Quantity: 90,  # refills: 3   Last office visit: 7/25/2018 with prescribing provider:  Buddy   Future Office Visit:        Requested Prescriptions   Pending Prescriptions Disp Refills     amLODIPine (NORVASC) 5 MG tablet [Pharmacy Med Name: AMLODIPINE BESYLATE 5MG TABLETS] 90 tablet 0     Sig: TAKE 1 TABLET(5 MG) BY MOUTH DAILY    Calcium Channel Blockers Protocol  Passed    10/17/2018  7:13 PM       Passed - Blood pressure under 140/90 in past 12 months    BP Readings from Last 3 Encounters:   07/25/18 128/82   04/24/18 164/84   02/28/18 158/86                Passed - Recent (12 mo) or future (30 days) visit within the authorizing provider's specialty    Patient had office visit in the last 12 months or has a visit in the next 30 days with authorizing provider or within the authorizing provider's specialty.  See \"Patient Info\" tab in inbasket, or \"Choose Columns\" in Meds & Orders section of the refill encounter.             Passed - Patient is age 18 or older       Passed - Normal serum creatinine on file in past 12 months    Recent Labs   Lab Test  09/17/18   1000   CR  1.24            Last Written Prescription Date:  11/1/17  Last Fill Quantity: 90,  # refills: 3   Last office visit: 7/25/2018 with prescribing provider:  Buddy   Future Office Visit:         atorvastatin (LIPITOR) 10 MG tablet [Pharmacy Med Name: ATORVASTATIN 10MG TABLETS] 90 tablet 0     Sig: TAKE 1 TABLET BY MOUTH EVERY NIGHT AT BEDTIME    Statins Protocol Passed    10/17/2018  7:13 PM       Passed - LDL on file in past 12 months    Recent Labs   Lab Test  09/17/18   1000   LDL  104*            Passed - No abnormal creatine kinase in past 12 months    No lab results found.            Passed - Recent (12 mo) or future (30 days) visit within the authorizing provider's specialty    Patient had office visit in the last 12 months or has a visit in the next 30 days with authorizing provider or " "within the authorizing provider's specialty.  See \"Patient Info\" tab in inbasket, or \"Choose Columns\" in Meds & Orders section of the refill encounter.             Passed - Patient is age 18 or older      \    "

## 2018-10-22 ENCOUNTER — TRANSFERRED RECORDS (OUTPATIENT)
Dept: HEALTH INFORMATION MANAGEMENT | Facility: CLINIC | Age: 63
End: 2018-10-22

## 2019-01-28 NOTE — ADDENDUM NOTE
Encounter addended by: Ashlyn Caballero, PT on: 1/28/2019 12:03 PM   Actions taken: Sign clinical note, Episode resolved

## 2019-01-28 NOTE — PROGRESS NOTES
Outpatient Physical Therapy Discharge Note     Patient: Adebayo Sierra  : 1955    Beginning/End Dates of Reporting Period:  9/15/17 - Evaluation only  Pt failed to schedule appointments following PT evaluation.    Referring Provider: Kobe Goodwin MD    Therapy Diagnosis: impaired functional mobility    Clinical Impression on evaluation: Unable to complete assessment d/t elevated BP at rest. During assessment therapist spoke with Dr King s triage nurse to discuss elevated BP which are known by MD. She confirms no cause for emergent follow-up, and recommending plan for continued self monitoring for any associated sxs. Therapist also requesting f/u on MD s intention from assessment: return to work, treatment of mild strength deficits observed per MD note, or closer cardiac monitoring with exercise to exaggerated BP. Recommending separate orders for return to work or cardiac rehab as indicated. Therapist reviewed this discussion with Adebayo, advising emergent follow-up with any stroke-like sxs, follow-up with MD as planned, and follow-up with therapy as indicated. His daughter, Chey arrives to speak to therapist following session for clarification d/t her father s frustration and confusion around purpose of therapy visit. She reports he noted continued fine motor coordination limitations, and therapist discussed possible follow up with occupational therapist to address fine motor dexterity concerns. She reports good understanding of repeated education around elevated BPs and appropraite follow-up, and will continue to assist her father in directing his care. Following this appointment, this therapist received VM clarifying MD s intention for therapist to address any residual weakness, without return to work assessment. Therapist recommending pt return for completed PT assessment after f/u with MD and improved BP control.    Plan at evaluation: Therapist recommending pt return for completed PT assessment  after f/u with MD and improved BP control. complete assessment: vital assessment at rest and with activity, UE/LE strength, ROM, coordination, 30 sec sit <> stand, FGA, establish POC as indicated       Client Self Report: see eval      Goals:  Goal Identifier HEP   Goal Description Pt to demonstrate appropraite vital response to walking and functional strengthening exercise.   Target Date 11/10/17   Date Met      Progress:     Progress Toward Goals:   Seen for evaluation only    Plan:  Discharge from therapy.    Discharge:    Reason for Discharge: Patient has failed to schedule further appointments.

## 2019-06-18 ENCOUNTER — TELEPHONE (OUTPATIENT)
Dept: INTERNAL MEDICINE | Facility: CLINIC | Age: 64
End: 2019-06-18

## 2019-06-18 ENCOUNTER — MYC MEDICAL ADVICE (OUTPATIENT)
Dept: INTERNAL MEDICINE | Facility: CLINIC | Age: 64
End: 2019-06-18

## 2019-06-18 NOTE — TELEPHONE ENCOUNTER
Panel Management Review    Patient Active Problem List   Diagnosis     Counseling regarding advanced directives     Essential hypertension, benign     Hyperlipidemia LDL goal <100     Tobacco abuse     Cerebral infarction due to occlusion of left internal carotid artery (H)     Asymptomatic stenosis of right carotid artery       Patient has the following on his problem list:     Hypertension   Last three blood pressure readings:  BP Readings from Last 3 Encounters:   07/25/18 128/82   04/24/18 164/84   02/28/18 158/86     Blood pressure: Passed    HTN Guidelines:  Less than 140/90      Composite cancer screening  Chart review shows that this patient is due/due soon for the following Colonoscopy  Summary:    Patient is due/failing the following:   COLONOSCOPY    Action needed:   Patient needs to schedule colonoscopy.    Type of outreach:    Sent Shake message.    Questions for provider review:    None                                                                                                                                    Enma Gonzalez, CMA

## 2019-07-13 DIAGNOSIS — E78.5 HYPERLIPIDEMIA LDL GOAL <100: ICD-10-CM

## 2019-07-13 DIAGNOSIS — I10 ESSENTIAL HYPERTENSION, BENIGN: ICD-10-CM

## 2019-07-14 NOTE — TELEPHONE ENCOUNTER
"Requested Prescriptions   Pending Prescriptions Disp Refills     atorvastatin (LIPITOR) 10 MG tablet [Pharmacy Med Name: ATORVASTATIN 10MG TABLETS] 90 tablet 0     Sig: TAKE 1 TABLET BY MOUTH EVERY NIGHT AT BEDTIME   Last Written Prescription Date:  10/18/2018  Last Fill Quantity: 90,  # refills: 2   Last Office Visit: 7/25/2018   Future Office Visit:         Statins Protocol Passed - 7/13/2019  3:13 PM        Passed - LDL on file in past 12 months     Recent Labs   Lab Test 09/17/18  1000   *             Passed - No abnormal creatine kinase in past 12 months     No lab results found.             Passed - Recent (12 mo) or future (30 days) visit within the authorizing provider's specialty     Patient had office visit in the last 12 months or has a visit in the next 30 days with authorizing provider or within the authorizing provider's specialty.  See \"Patient Info\" tab in inbasket, or \"Choose Columns\" in Meds & Orders section of the refill encounter.              Passed - Medication is active on med list        Passed - Patient is age 18 or older        lisinopril (PRINIVIL/ZESTRIL) 10 MG tablet [Pharmacy Med Name: LISINOPRIL 10MG TABLETS] 90 tablet 0     Sig: TAKE 1 TABLET(10 MG) BY MOUTH DAILY   Last Written Prescription Date:  7/25/2018  Last Fill Quantity: 90,  # refills: 3   Last Office Visit: 7/25/2018   Future Office Visit:         ACE Inhibitors (Including Combos) Protocol Passed - 7/13/2019  3:13 PM        Passed - Blood pressure under 140/90 in past 12 months     BP Readings from Last 3 Encounters:   07/25/18 128/82   04/24/18 164/84   02/28/18 158/86                 Passed - Recent (12 mo) or future (30 days) visit within the authorizing provider's specialty     Patient had office visit in the last 12 months or has a visit in the next 30 days with authorizing provider or within the authorizing provider's specialty.  See \"Patient Info\" tab in inbasket, or \"Choose Columns\" in Meds & Orders section of " the refill encounter.              Passed - Medication is active on med list        Passed - Patient is age 18 or older        Passed - Normal serum creatinine on file in past 12 months     Recent Labs   Lab Test 09/17/18  1000   CR 1.24             Passed - Normal serum potassium on file in past 12 months     Recent Labs   Lab Test 09/17/18  1000   POTASSIUM 4.4

## 2019-07-15 DIAGNOSIS — F41.9 ANXIETY: ICD-10-CM

## 2019-07-15 RX ORDER — ATORVASTATIN CALCIUM 10 MG/1
TABLET, FILM COATED ORAL
Qty: 90 TABLET | Refills: 0 | Status: SHIPPED | OUTPATIENT
Start: 2019-07-15

## 2019-07-15 RX ORDER — LISINOPRIL 10 MG/1
TABLET ORAL
Qty: 90 TABLET | Refills: 0 | Status: SHIPPED | OUTPATIENT
Start: 2019-07-15

## 2019-07-15 NOTE — TELEPHONE ENCOUNTER
Prescription approved per [facility] Refill Protocol.    Irais BARAJAS RN, BSN, PHN

## 2019-07-15 NOTE — LETTER
[facility]  [address] [address]  [phone]            Adebayo Sierra  [address]  [facility] [address]        July 16, 2019    Dear Adebayo,    While refilling your prescription today, we noticed that you are due for an appointment with your provider.  We will refill your prescription for 30 days, but a follow-up appointment must be made before any additional refills can be approved.     Taking care of your health is important to us and we look forward to seeing you in the near future.  Please call us at [phone] or [phone] (or use [employer]) to schedule an appointment.     Please disregard this notice if you have already made an appointment.    Sincerely,        [facility]

## 2019-07-16 RX ORDER — CITALOPRAM HYDROBROMIDE 20 MG/1
TABLET ORAL
Qty: 30 TABLET | Refills: 0 | Status: SHIPPED | OUTPATIENT
Start: 2019-07-16

## 2019-07-16 NOTE — TELEPHONE ENCOUNTER
"Requested Prescriptions   Pending Prescriptions Disp Refills     citalopram (CELEXA) 20 MG tablet [Pharmacy Med Name: CITALOPRAM 20MG TABLETS] 90 tablet 0     Sig: TAKE 1 TABLET BY MOUTH DAILY       SSRIs Protocol Passed - 7/15/2019  3:45 PM        Passed - Recent (12 mo) or future (30 days) visit within the authorizing provider's specialty     Patient had office visit in the last 12 months or has a visit in the next 30 days with authorizing provider or within the authorizing provider's specialty.  See \"Patient Info\" tab in inbasket, or \"Choose Columns\" in Meds & Orders section of the refill encounter.              Passed - Medication is active on med list        Passed - Patient is age 18 or older        Last Written Prescription Date:  4/24/18  Last Fill Quantity: 90,  # refills: 3   Last office visit: 7/25/2018 with prescribing provider:  PCP   Future Office Visit:      "

## 2019-08-12 DIAGNOSIS — F41.9 ANXIETY: ICD-10-CM

## 2019-08-12 NOTE — TELEPHONE ENCOUNTER
"Requested Prescriptions   Pending Prescriptions Disp Refills     citalopram (CELEXA) 20 MG tablet [Pharmacy Med Name: CITALOPRAM 20MG TABLETS] 30 tablet 0     Sig: TAKE 1 TABLET BY MOUTH DAILY       SSRIs Protocol Failed - 8/12/2019 10:05 AM        Failed - Recent (12 mo) or future (30 days) visit within the authorizing provider's specialty     Patient had office visit in the last 12 months or has a visit in the next 30 days with authorizing provider or within the authorizing provider's specialty.  See \"Patient Info\" tab in inbasket, or \"Choose Columns\" in Meds & Orders section of the refill encounter.              Passed - Medication is active on med list        Passed - Patient is age 18 or older        Last Written Prescription Date:  07/16/2019  Last Fill Quantity: 30,  # refills: 0   Last office visit: 7/25/2018 with prescribing provider:  Dr Goodwin   Future Office Visit:      "

## 2019-08-13 RX ORDER — CITALOPRAM HYDROBROMIDE 20 MG/1
TABLET ORAL
Qty: 30 TABLET | Refills: 0 | OUTPATIENT
Start: 2019-08-13

## 2019-08-13 NOTE — TELEPHONE ENCOUNTER
Routing refill request to provider for review/approval because:  Anna given x1 and patient did not follow up, please advise  Patient needs to be seen because it has been more than 1 year since last office visit.

## 2019-09-30 ENCOUNTER — HEALTH MAINTENANCE LETTER (OUTPATIENT)
Age: 64
End: 2019-09-30

## 2019-10-10 DIAGNOSIS — E78.5 HYPERLIPIDEMIA LDL GOAL <100: ICD-10-CM

## 2019-10-10 DIAGNOSIS — I10 ESSENTIAL HYPERTENSION, BENIGN: ICD-10-CM

## 2019-10-10 RX ORDER — LISINOPRIL 10 MG/1
TABLET ORAL
Qty: 90 TABLET | Refills: 0 | OUTPATIENT
Start: 2019-10-10

## 2019-10-10 RX ORDER — ATORVASTATIN CALCIUM 10 MG/1
TABLET, FILM COATED ORAL
Qty: 90 TABLET | Refills: 0 | OUTPATIENT
Start: 2019-10-10

## 2019-10-10 NOTE — TELEPHONE ENCOUNTER
"Requested Prescriptions   Pending Prescriptions Disp Refills     lisinopril (PRINIVIL/ZESTRIL) 10 MG tablet [Pharmacy Med Name: LISINOPRIL 10MG TABLETS] 90 tablet 0     Sig: TAKE 1 TABLET(10 MG) BY MOUTH DAILY       ACE Inhibitors (Including Combos) Protocol Failed - 10/10/2019 10:42 AM        Failed - Blood pressure under 140/90 in past 12 months     BP Readings from Last 3 Encounters:   07/25/18 128/82   04/24/18 164/84   02/28/18 158/86                 Failed - Recent (12 mo) or future (30 days) visit within the authorizing provider's specialty     Patient has had an office visit with the authorizing provider or a provider within the authorizing providers department within the previous 12 mos or has a future within next 30 days. See \"Patient Info\" tab in inbasket, or \"Choose Columns\" in Meds & Orders section of the refill encounter.              Failed - Normal serum creatinine on file in past 12 months     Recent Labs   Lab Test 09/17/18  1000   CR 1.24             Failed - Normal serum potassium on file in past 12 months     Recent Labs   Lab Test 09/17/18  1000   POTASSIUM 4.4             Passed - Medication is active on med list        Passed - Patient is age 18 or older        atorvastatin (LIPITOR) 10 MG tablet [Pharmacy Med Name: ATORVASTATIN 10MG TABLETS] 90 tablet 0     Sig: TAKE 1 TABLET BY MOUTH EVERY NIGHT AT BEDTIME       Statins Protocol Failed - 10/10/2019 10:42 AM        Failed - LDL on file in past 12 months     Recent Labs   Lab Test 09/17/18  1000   *             Failed - Recent (12 mo) or future (30 days) visit within the authorizing provider's specialty     Patient has had an office visit with the authorizing provider or a provider within the authorizing providers department within the previous 12 mos or has a future within next 30 days. See \"Patient Info\" tab in inbasket, or \"Choose Columns\" in Meds & Orders section of the refill encounter.              Passed - No abnormal creatine " kinase in past 12 months     No lab results found.             Passed - Medication is active on med list        Passed - Patient is age 18 or older        Last Written Prescription Date:  7/15/2019  Last Fill Quantity: 90,  # refills: 0   Last office visit: 7/25/2018 with prescribing provider:  Kobe Goodwin     Future Office Visit:

## 2019-10-10 NOTE — TELEPHONE ENCOUNTER
Patient has established care with new [facility].     Does was reduced d/t low BP readings. Current request is old dose. New PCP is Dr. Seth Ingram.    Irais BARAJAS RN, BSN, PHN

## 2019-10-22 DIAGNOSIS — I10 ESSENTIAL HYPERTENSION, BENIGN: ICD-10-CM

## 2019-10-22 RX ORDER — AMLODIPINE BESYLATE 5 MG/1
TABLET ORAL
Qty: 90 TABLET | Refills: 0 | OUTPATIENT
Start: 2019-10-22

## 2019-10-22 RX ORDER — LISINOPRIL 40 MG/1
TABLET ORAL
Qty: 180 TABLET | Refills: 0 | OUTPATIENT
Start: 2019-10-22

## 2019-10-22 NOTE — TELEPHONE ENCOUNTER
"Requested Prescriptions   Pending Prescriptions Disp Refills     amLODIPine (NORVASC) 5 MG tablet [Pharmacy Med Name: AMLODIPINE BESYLATE 5MG TABLETS] 90 tablet 0     Sig: TAKE 1 TABLET(5 MG) BY MOUTH DAILY       Calcium Channel Blockers Protocol  Failed - 10/22/2019  9:19 AM        Failed - Blood pressure under 140/90 in past 12 months     BP Readings from Last 3 Encounters:   07/25/18 128/82   04/24/18 164/84   02/28/18 158/86                 Failed - Recent (12 mo) or future (30 days) visit within the authorizing provider's specialty     Patient has had an office visit with the authorizing provider or a provider within the authorizing providers department within the previous 12 mos or has a future within next 30 days. See \"Patient Info\" tab in inbasket, or \"Choose Columns\" in Meds & Orders section of the refill encounter.              Failed - Normal serum creatinine on file in past 12 months     Recent Labs   Lab Test 09/17/18  1000   CR 1.24             Passed - Medication is active on med list        Passed - Patient is age 18 or older        lisinopril (PRINIVIL/ZESTRIL) 40 MG tablet [Pharmacy Med Name: LISINOPRIL 40MG TABLETS] 180 tablet 0     Sig: TAKE 1/2 TABLET BY MOUTH TWICE DAILY       ACE Inhibitors (Including Combos) Protocol Failed - 10/22/2019  9:19 AM        Failed - Blood pressure under 140/90 in past 12 months     BP Readings from Last 3 Encounters:   07/25/18 128/82   04/24/18 164/84   02/28/18 158/86                 Failed - Recent (12 mo) or future (30 days) visit within the authorizing provider's specialty     Patient has had an office visit with the authorizing provider or a provider within the authorizing providers department within the previous 12 mos or has a future within next 30 days. See \"Patient Info\" tab in inbasket, or \"Choose Columns\" in Meds & Orders section of the refill encounter.              Failed - Normal serum creatinine on file in past 12 months     Recent Labs   Lab Test " 09/17/18  1000   CR 1.24             Failed - Normal serum potassium on file in past 12 months     Recent Labs   Lab Test 09/17/18  1000   POTASSIUM 4.4             Passed - Medication is active on med list        Passed - Patient is age 18 or older        Last Written Prescription Date:  10/18/2019  Last Fill Quantity: 90,  # refills: 2   Last office visit: 7/25/2018 with prescribing provider:  Dr. Goodwin   Future Office Visit:      Last Written Prescription Date:  07/15/2019  Last Fill Quantity: 90,  # refills: 0   Last office visit: 7/25/2018 with prescribing provider:  Dr. Goodwin   Future Office Visit:

## 2021-01-15 ENCOUNTER — HEALTH MAINTENANCE LETTER (OUTPATIENT)
Age: 66
End: 2021-01-15

## 2021-10-24 ENCOUNTER — HEALTH MAINTENANCE LETTER (OUTPATIENT)
Age: 66
End: 2021-10-24

## 2022-02-13 ENCOUNTER — HEALTH MAINTENANCE LETTER (OUTPATIENT)
Age: 67
End: 2022-02-13

## 2022-10-16 ENCOUNTER — HEALTH MAINTENANCE LETTER (OUTPATIENT)
Age: 67
End: 2022-10-16

## 2023-03-26 ENCOUNTER — HEALTH MAINTENANCE LETTER (OUTPATIENT)
Age: 68
End: 2023-03-26